# Patient Record
Sex: MALE | Race: BLACK OR AFRICAN AMERICAN | NOT HISPANIC OR LATINO | Employment: FULL TIME | URBAN - METROPOLITAN AREA
[De-identification: names, ages, dates, MRNs, and addresses within clinical notes are randomized per-mention and may not be internally consistent; named-entity substitution may affect disease eponyms.]

---

## 2022-02-03 ENCOUNTER — OFFICE VISIT (OUTPATIENT)
Dept: FAMILY MEDICINE | Facility: CLINIC | Age: 57
End: 2022-02-03
Payer: COMMERCIAL

## 2022-02-03 ENCOUNTER — LAB VISIT (OUTPATIENT)
Dept: LAB | Facility: HOSPITAL | Age: 57
End: 2022-02-03
Attending: FAMILY MEDICINE
Payer: COMMERCIAL

## 2022-02-03 VITALS
RESPIRATION RATE: 17 BRPM | HEART RATE: 79 BPM | DIASTOLIC BLOOD PRESSURE: 84 MMHG | SYSTOLIC BLOOD PRESSURE: 145 MMHG | OXYGEN SATURATION: 97 %

## 2022-02-03 DIAGNOSIS — Z00.00 VISIT FOR WELL MAN HEALTH CHECK: ICD-10-CM

## 2022-02-03 DIAGNOSIS — M54.16 LUMBAR RADICULOPATHY, CHRONIC: ICD-10-CM

## 2022-02-03 DIAGNOSIS — M54.31 BILATERAL SCIATICA: ICD-10-CM

## 2022-02-03 DIAGNOSIS — Z00.00 VISIT FOR WELL MAN HEALTH CHECK: Primary | ICD-10-CM

## 2022-02-03 DIAGNOSIS — M54.32 BILATERAL SCIATICA: ICD-10-CM

## 2022-02-03 DIAGNOSIS — M54.9 DORSALGIA, UNSPECIFIED: ICD-10-CM

## 2022-02-03 DIAGNOSIS — Z12.11 COLON CANCER SCREENING: ICD-10-CM

## 2022-02-03 LAB
ALBUMIN SERPL BCP-MCNC: 3.9 G/DL (ref 3.5–5.2)
ALP SERPL-CCNC: 50 U/L (ref 55–135)
ALT SERPL W/O P-5'-P-CCNC: 17 U/L (ref 10–44)
ANION GAP SERPL CALC-SCNC: 9 MMOL/L (ref 8–16)
AST SERPL-CCNC: 20 U/L (ref 10–40)
BASOPHILS # BLD AUTO: 0.05 K/UL (ref 0–0.2)
BASOPHILS NFR BLD: 0.6 % (ref 0–1.9)
BILIRUB SERPL-MCNC: 0.5 MG/DL (ref 0.1–1)
BUN SERPL-MCNC: 11 MG/DL (ref 6–20)
CALCIUM SERPL-MCNC: 9.2 MG/DL (ref 8.7–10.5)
CHLORIDE SERPL-SCNC: 103 MMOL/L (ref 95–110)
CHOLEST SERPL-MCNC: 200 MG/DL (ref 120–199)
CHOLEST/HDLC SERPL: 3.6 {RATIO} (ref 2–5)
CO2 SERPL-SCNC: 27 MMOL/L (ref 23–29)
COMPLEXED PSA SERPL-MCNC: 1.6 NG/ML (ref 0–4)
CREAT SERPL-MCNC: 0.9 MG/DL (ref 0.5–1.4)
DIFFERENTIAL METHOD: ABNORMAL
EOSINOPHIL # BLD AUTO: 0.2 K/UL (ref 0–0.5)
EOSINOPHIL NFR BLD: 2.5 % (ref 0–8)
ERYTHROCYTE [DISTWIDTH] IN BLOOD BY AUTOMATED COUNT: 13.7 % (ref 11.5–14.5)
EST. GFR  (AFRICAN AMERICAN): >60 ML/MIN/1.73 M^2
EST. GFR  (NON AFRICAN AMERICAN): >60 ML/MIN/1.73 M^2
GLUCOSE SERPL-MCNC: 95 MG/DL (ref 70–110)
HCT VFR BLD AUTO: 42.6 % (ref 40–54)
HDLC SERPL-MCNC: 56 MG/DL (ref 40–75)
HDLC SERPL: 28 % (ref 20–50)
HGB BLD-MCNC: 13.8 G/DL (ref 14–18)
IMM GRANULOCYTES # BLD AUTO: 0.03 K/UL (ref 0–0.04)
IMM GRANULOCYTES NFR BLD AUTO: 0.3 % (ref 0–0.5)
LDLC SERPL CALC-MCNC: 129.8 MG/DL (ref 63–159)
LYMPHOCYTES # BLD AUTO: 3.2 K/UL (ref 1–4.8)
LYMPHOCYTES NFR BLD: 35.3 % (ref 18–48)
MCH RBC QN AUTO: 28.8 PG (ref 27–31)
MCHC RBC AUTO-ENTMCNC: 32.4 G/DL (ref 32–36)
MCV RBC AUTO: 89 FL (ref 82–98)
MONOCYTES # BLD AUTO: 0.7 K/UL (ref 0.3–1)
MONOCYTES NFR BLD: 7.7 % (ref 4–15)
NEUTROPHILS # BLD AUTO: 4.8 K/UL (ref 1.8–7.7)
NEUTROPHILS NFR BLD: 53.6 % (ref 38–73)
NONHDLC SERPL-MCNC: 144 MG/DL
NRBC BLD-RTO: 0 /100 WBC
PLATELET # BLD AUTO: 335 K/UL (ref 150–450)
PMV BLD AUTO: 9.1 FL (ref 9.2–12.9)
POTASSIUM SERPL-SCNC: 4.4 MMOL/L (ref 3.5–5.1)
PROT SERPL-MCNC: 7.2 G/DL (ref 6–8.4)
RBC # BLD AUTO: 4.79 M/UL (ref 4.6–6.2)
SODIUM SERPL-SCNC: 139 MMOL/L (ref 136–145)
TRIGL SERPL-MCNC: 71 MG/DL (ref 30–150)
WBC # BLD AUTO: 8.96 K/UL (ref 3.9–12.7)

## 2022-02-03 PROCEDURE — 3079F DIAST BP 80-89 MM HG: CPT | Mod: CPTII,S$GLB,, | Performed by: FAMILY MEDICINE

## 2022-02-03 PROCEDURE — 3079F PR MOST RECENT DIASTOLIC BLOOD PRESSURE 80-89 MM HG: ICD-10-PCS | Mod: CPTII,S$GLB,, | Performed by: FAMILY MEDICINE

## 2022-02-03 PROCEDURE — 1160F PR REVIEW ALL MEDS BY PRESCRIBER/CLIN PHARMACIST DOCUMENTED: ICD-10-PCS | Mod: CPTII,S$GLB,, | Performed by: FAMILY MEDICINE

## 2022-02-03 PROCEDURE — 3077F SYST BP >= 140 MM HG: CPT | Mod: CPTII,S$GLB,, | Performed by: FAMILY MEDICINE

## 2022-02-03 PROCEDURE — 99999 PR PBB SHADOW E&M-EST. PATIENT-LVL IV: ICD-10-PCS | Mod: PBBFAC,,, | Performed by: FAMILY MEDICINE

## 2022-02-03 PROCEDURE — 85025 COMPLETE CBC W/AUTO DIFF WBC: CPT | Performed by: FAMILY MEDICINE

## 2022-02-03 PROCEDURE — 99386 PREV VISIT NEW AGE 40-64: CPT | Mod: 25,S$GLB,, | Performed by: FAMILY MEDICINE

## 2022-02-03 PROCEDURE — 36415 COLL VENOUS BLD VENIPUNCTURE: CPT | Mod: PN | Performed by: FAMILY MEDICINE

## 2022-02-03 PROCEDURE — 80053 COMPREHEN METABOLIC PANEL: CPT | Performed by: FAMILY MEDICINE

## 2022-02-03 PROCEDURE — 3077F PR MOST RECENT SYSTOLIC BLOOD PRESSURE >= 140 MM HG: ICD-10-PCS | Mod: CPTII,S$GLB,, | Performed by: FAMILY MEDICINE

## 2022-02-03 PROCEDURE — 99202 PR OFFICE/OUTPT VISIT, NEW, LEVL II, 15-29 MIN: ICD-10-PCS | Mod: 25,S$GLB,, | Performed by: FAMILY MEDICINE

## 2022-02-03 PROCEDURE — 80061 LIPID PANEL: CPT | Performed by: FAMILY MEDICINE

## 2022-02-03 PROCEDURE — 1159F MED LIST DOCD IN RCRD: CPT | Mod: CPTII,S$GLB,, | Performed by: FAMILY MEDICINE

## 2022-02-03 PROCEDURE — 1159F PR MEDICATION LIST DOCUMENTED IN MEDICAL RECORD: ICD-10-PCS | Mod: CPTII,S$GLB,, | Performed by: FAMILY MEDICINE

## 2022-02-03 PROCEDURE — 84153 ASSAY OF PSA TOTAL: CPT | Performed by: FAMILY MEDICINE

## 2022-02-03 PROCEDURE — 99999 PR PBB SHADOW E&M-EST. PATIENT-LVL IV: CPT | Mod: PBBFAC,,, | Performed by: FAMILY MEDICINE

## 2022-02-03 PROCEDURE — 99202 OFFICE O/P NEW SF 15 MIN: CPT | Mod: 25,S$GLB,, | Performed by: FAMILY MEDICINE

## 2022-02-03 PROCEDURE — 99386 PR PREVENTIVE VISIT,NEW,40-64: ICD-10-PCS | Mod: 25,S$GLB,, | Performed by: FAMILY MEDICINE

## 2022-02-03 PROCEDURE — 1160F RVW MEDS BY RX/DR IN RCRD: CPT | Mod: CPTII,S$GLB,, | Performed by: FAMILY MEDICINE

## 2022-02-03 PROCEDURE — 86803 HEPATITIS C AB TEST: CPT | Performed by: FAMILY MEDICINE

## 2022-02-03 RX ORDER — TIZANIDINE 4 MG/1
4 TABLET ORAL EVERY 6 HOURS PRN
Qty: 30 TABLET | Refills: 1 | Status: SHIPPED | OUTPATIENT
Start: 2022-02-03 | End: 2022-02-13

## 2022-02-03 RX ORDER — SULINDAC 200 MG/1
200 TABLET ORAL 2 TIMES DAILY
Qty: 60 TABLET | Refills: 1 | Status: SHIPPED | OUTPATIENT
Start: 2022-02-03 | End: 2022-03-18 | Stop reason: CLARIF

## 2022-02-03 NOTE — PROGRESS NOTES
"Well Man VISIT      CHIEF COMPLAINT  Chief Complaint   Patient presents with    [facility]       HPI  Chanjesusmichael Dave Greenwood is a 56 y.o. male who presents for physical.     Social Factors  Tobacco use: Yes 6-7 cigarettes a sday  Ready to Quit: No  Alcohol: Yes occasionally  Intimate partner violence screening  "Do you feel safe in your current relationship?" Yes   "Have you ever been in a relationship in which your partner frightened you or hurt you?" No  Living Will/POA: No  Regular Exercise: No    Depression  Over the past two weeks, have you felt down, depressed, or hopeless? No  Over the past two weeks, have you felt little interest or pleasure in doing things? No    Reproductive Health  STD screening in last year: deferred  HIV screening: deferred    Screen for Chronic Disease  CHD Risk Factors: advanced age (older than 55 for men, 65 for women), male gender and smoking/ tobacco exposure  There is no height or weight on file to calculate BMI.  Dyslipidemia screening needed: order today  T2DM screening needed: order today  Colonoscopy needed: order today  PSA needed: order today  AAA screening needed:n/a  Screen men 35 years and older, and men 20 to 34 years of age who have cardiovascular risk factors for dyslipidemia  Begin screening colonoscopies at 50 years of age in men of average risk, and continue until 75 years of age; offer fecal occult blood testing every year, flexible sigmoidoscopy every five years combined with fecal occult blood testing every three years, or colonoscopy every 10 years   The American Urological Association recommends offering PSA testing and digital rectal examination to well-informed men beginning at 40 years of age and continuing until life expectancy is less than 10 years  Screen once with ultrasonography in men 65 to 75 years of age if they have a family history or have smoked at [id number] cigarettes in their lifetime  Screen men with a sustained blood pressure greater " than 135/80 mm Hg for T2DM      Immunizations  delayed    ALLERGIES and MEDS were verified.   PMHx, PSHx, FHx, SOCIALHx were updated as pertinent.    REVIEW OF SYSTEMS  Review of Systems   Constitutional: Negative.    HENT: Negative.    Eyes: Negative.    Respiratory: Negative.    Cardiovascular: Negative.    Gastrointestinal: Negative.    Genitourinary: Negative.    Musculoskeletal: Positive for myalgias.   Skin: Negative.    Neurological: Positive for tingling.         PHYSICAL EXAM  VITAL SIGNS: BP (!) 145/84 (BP Location: Left arm, Patient Position: Sitting, BP Method: Medium (Automatic))   Pulse 79   Resp 17   SpO2 97%   GEN: Well developed, Well nourished, No acute distress.  HENT: Normocephalic, Atraumatic, Bilateral external ears normal, Nose normal, Oropharynx moist, No oral exudates.   Eyes: PERRLA, EOMI, Conjunctiva normal, No discharge.   Neck: Supple, No tenderness.  Lymphatic: No cervical or supraclavicular lymphadenopathy noted.   Cardiovascular: Normal heart rate, Normal rhythm, No murmurs, No rubs, No gallops.   Thorax & Lungs: Normal breath sounds, No respiratory distress, No wheezing.  Abdomen: Soft, No tenderness, Bowel sounds normal.  Genital: deferred deferred  Skin: Warm, Dry, No erythema, No rash.   Extremities: No edema, No tenderness.       ASSESSMENT/PLAN    Vicente was seen today for establish care.    Diagnoses and all orders for this visit:    Visit for [facility] check  -     CBC Auto Differential; Future  -     Comprehensive Metabolic Panel; Future  -     Lipid Panel; Future  -     PSA, Screening; Future  -     Urinalysis; Future  -     Hepatitis C Antibody; Future    Colon cancer screening  -     Case Request Endoscopy: COLONOSCOPY    Bilateral sciatica  -     tiZANidine (ZANAFLEX) 4 MG tablet; Take 1 tablet (4 mg total) by mouth every 6 (six) hours as needed (muscle spasms in back).  -     sulindac (CLINORIL) 200 MG Tab; Take 1 tablet (200 mg total) by mouth 2 (two) times  daily.    Dorsalgia, unspecified  -     MRI Lumbar Spine Without Contrast; Future    Lumbar radiculopathy, chronic  -     MRI Lumbar Spine Without Contrast; Future         FOLLOW UP: pending labs and imaging

## 2022-02-03 NOTE — PROGRESS NOTES
Patient presenting today for chronic back pain x 8 years that has progressively been worsening.  He states the pain worsens when he leans forwards and radiates down both lower extremities.  Had MRI at [facility] in 2014 that he states showed bulging disks, but doesn't recall anything else abnormal.  No history of spine surgery and no personal/family history of neurological disorders.  His grandfather did die of prostate cancer at age 80.  Patient states pain is affecting daily living and limiting him as to what he can do.   No loss of bowel or bladder control, but does get intermittent tingling in bilateral inner thighs.      Physical exam:  No pain on palpattion of spinous processes  Mild pain on palpation of paraspinal muscles  Pain with bending forward      A/p  Refer to pain management  MRI lumbar spine ordered as symptoms are chronic and worsening  NSAID and muscle relaxer for pain  Patient to go to the ED for any sudden weakness or loss of bowel/bladder control        Cesar Herrera MD

## 2022-02-04 LAB — HCV AB SERPL QL IA: NEGATIVE

## 2022-02-08 ENCOUNTER — TELEPHONE (OUTPATIENT)
Dept: FAMILY MEDICINE | Facility: CLINIC | Age: 57
End: 2022-02-08
Payer: COMMERCIAL

## 2022-02-08 DIAGNOSIS — E78.5 DYSLIPIDEMIA: Primary | ICD-10-CM

## 2022-02-08 RX ORDER — ATORVASTATIN CALCIUM 40 MG/1
40 TABLET, FILM COATED ORAL DAILY
Qty: 90 TABLET | Refills: 1 | Status: SHIPPED | OUTPATIENT
Start: 2022-02-08 | End: 2022-02-16

## 2022-02-08 NOTE — PROGRESS NOTES
Please let patient know that labs are back and most look good.  His cholesterol is at a level that needs medication, so I am going to send that to his pharmacy.  He will need to take it nightly and follow up 3 months.      Thanks  Dr. Herrera       The 10-year ASCVD risk score (Oscar STRANGE Jr., et al., 2013) is: 14%    Values used to calculate the score:      Age: 56 years      Sex: Male      Is Non- : Yes      Diabetic: No      Tobacco smoker: Yes      Systolic Blood Pressure: 145 mmHg      Is BP treated: No      HDL Cholesterol: 56 mg/dL      Total Cholesterol: 200 mg/dL

## 2022-02-09 ENCOUNTER — TELEPHONE (OUTPATIENT)
Dept: FAMILY MEDICINE | Facility: CLINIC | Age: 57
End: 2022-02-09
Payer: COMMERCIAL

## 2022-02-10 ENCOUNTER — HOSPITAL ENCOUNTER (OUTPATIENT)
Dept: RADIOLOGY | Facility: HOSPITAL | Age: 57
Discharge: HOME | End: 2022-02-10
Attending: FAMILY MEDICINE
Payer: COMMERCIAL

## 2022-02-10 DIAGNOSIS — M54.16 LUMBAR RADICULOPATHY, CHRONIC: ICD-10-CM

## 2022-02-10 DIAGNOSIS — M54.9 DORSALGIA, UNSPECIFIED: ICD-10-CM

## 2022-02-10 PROCEDURE — 72148 MRI LUMBAR SPINE W/O DYE: CPT | Mod: TC

## 2022-02-10 PROCEDURE — 72148 MRI LUMBAR SPINE W/O DYE: CPT | Mod: 26,,, | Performed by: RADIOLOGY

## 2022-02-10 PROCEDURE — 72148 MRI LUMBAR SPINE WITHOUT CONTRAST: ICD-10-PCS | Mod: 26,,, | Performed by: RADIOLOGY

## 2022-02-11 ENCOUNTER — TELEPHONE (OUTPATIENT)
Dept: FAMILY MEDICINE | Facility: CLINIC | Age: 57
End: 2022-02-11
Payer: COMMERCIAL

## 2022-02-11 NOTE — TELEPHONE ENCOUNTER
----- Message from Cesar Herrera MD sent at 2/11/2022  8:12 AM CST -----  Please let patient know that his MRI showed some significant narrowing of the spinal canal and this is likely the cause of his pain.  I would recommend he see either pain management or neurosurgery to see what the next step would be.      Thanks  Dr. Herrera

## 2022-02-11 NOTE — PROGRESS NOTES
Please let patient know that his MRI showed some significant narrowing of the spinal canal and this is likely the cause of his pain.  I would recommend he see either pain management or neurosurgery to see what the next step would be.      Thanks  Dr. Herrera

## 2022-02-16 ENCOUNTER — OFFICE VISIT (OUTPATIENT)
Dept: PAIN MEDICINE | Facility: CLINIC | Age: 57
End: 2022-02-16
Payer: COMMERCIAL

## 2022-02-16 VITALS
SYSTOLIC BLOOD PRESSURE: 159 MMHG | OXYGEN SATURATION: 97 % | BODY MASS INDEX: 33.51 KG/M2 | DIASTOLIC BLOOD PRESSURE: 84 MMHG | HEART RATE: 72 BPM | HEIGHT: 63 IN | WEIGHT: 189.13 LBS

## 2022-02-16 DIAGNOSIS — M54.16 LUMBAR RADICULOPATHY: ICD-10-CM

## 2022-02-16 DIAGNOSIS — M48.062 SPINAL STENOSIS OF LUMBAR REGION WITH NEUROGENIC CLAUDICATION: ICD-10-CM

## 2022-02-16 DIAGNOSIS — M54.9 DORSALGIA, UNSPECIFIED: ICD-10-CM

## 2022-02-16 DIAGNOSIS — M71.38 SYNOVIAL CYST OF LUMBAR FACET JOINT: ICD-10-CM

## 2022-02-16 DIAGNOSIS — M47.816 LUMBAR SPONDYLOSIS: ICD-10-CM

## 2022-02-16 DIAGNOSIS — M51.36 DDD (DEGENERATIVE DISC DISEASE), LUMBAR: Primary | ICD-10-CM

## 2022-02-16 DIAGNOSIS — D17.79 EPIDURAL LIPOMATOSIS: ICD-10-CM

## 2022-02-16 DIAGNOSIS — M54.16 LUMBAR RADICULOPATHY, CHRONIC: ICD-10-CM

## 2022-02-16 PROBLEM — M51.369 DDD (DEGENERATIVE DISC DISEASE), LUMBAR: Status: ACTIVE | Noted: 2022-02-16

## 2022-02-16 PROCEDURE — 3079F PR MOST RECENT DIASTOLIC BLOOD PRESSURE 80-89 MM HG: ICD-10-PCS | Mod: CPTII,S$GLB,, | Performed by: PAIN MEDICINE

## 2022-02-16 PROCEDURE — 1159F PR MEDICATION LIST DOCUMENTED IN MEDICAL RECORD: ICD-10-PCS | Mod: CPTII,S$GLB,, | Performed by: PAIN MEDICINE

## 2022-02-16 PROCEDURE — 1159F MED LIST DOCD IN RCRD: CPT | Mod: CPTII,S$GLB,, | Performed by: PAIN MEDICINE

## 2022-02-16 PROCEDURE — 99204 PR OFFICE/OUTPT VISIT, NEW, LEVL IV, 45-59 MIN: ICD-10-PCS | Mod: S$GLB,,, | Performed by: PAIN MEDICINE

## 2022-02-16 PROCEDURE — 1160F RVW MEDS BY RX/DR IN RCRD: CPT | Mod: CPTII,S$GLB,, | Performed by: PAIN MEDICINE

## 2022-02-16 PROCEDURE — 3079F DIAST BP 80-89 MM HG: CPT | Mod: CPTII,S$GLB,, | Performed by: PAIN MEDICINE

## 2022-02-16 PROCEDURE — 99999 PR PBB SHADOW E&M-EST. PATIENT-LVL V: ICD-10-PCS | Mod: PBBFAC,,, | Performed by: PAIN MEDICINE

## 2022-02-16 PROCEDURE — 99999 PR PBB SHADOW E&M-EST. PATIENT-LVL V: CPT | Mod: PBBFAC,,, | Performed by: PAIN MEDICINE

## 2022-02-16 PROCEDURE — 3077F SYST BP >= 140 MM HG: CPT | Mod: CPTII,S$GLB,, | Performed by: PAIN MEDICINE

## 2022-02-16 PROCEDURE — 3008F BODY MASS INDEX DOCD: CPT | Mod: CPTII,S$GLB,, | Performed by: PAIN MEDICINE

## 2022-02-16 PROCEDURE — 3008F PR BODY MASS INDEX (BMI) DOCUMENTED: ICD-10-PCS | Mod: CPTII,S$GLB,, | Performed by: PAIN MEDICINE

## 2022-02-16 PROCEDURE — 99204 OFFICE O/P NEW MOD 45 MIN: CPT | Mod: S$GLB,,, | Performed by: PAIN MEDICINE

## 2022-02-16 PROCEDURE — 1160F PR REVIEW ALL MEDS BY PRESCRIBER/CLIN PHARMACIST DOCUMENTED: ICD-10-PCS | Mod: CPTII,S$GLB,, | Performed by: PAIN MEDICINE

## 2022-02-16 PROCEDURE — 3077F PR MOST RECENT SYSTOLIC BLOOD PRESSURE >= 140 MM HG: ICD-10-PCS | Mod: CPTII,S$GLB,, | Performed by: PAIN MEDICINE
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
## 2022-02-18 ENCOUNTER — OFFICE VISIT (OUTPATIENT)
Dept: NEUROSURGERY | Facility: CLINIC | Age: 57
End: 2022-02-18
Payer: COMMERCIAL

## 2022-02-18 ENCOUNTER — HOSPITAL ENCOUNTER (EMERGENCY)
Facility: HOSPITAL | Age: 57
Discharge: HOME | End: 2022-02-18
Attending: EMERGENCY MEDICINE
Payer: COMMERCIAL

## 2022-02-18 VITALS
WEIGHT: 188.25 LBS | HEIGHT: 63 IN | DIASTOLIC BLOOD PRESSURE: 90 MMHG | BODY MASS INDEX: 33.36 KG/M2 | HEART RATE: 71 BPM | SYSTOLIC BLOOD PRESSURE: 140 MMHG | OXYGEN SATURATION: 95 %

## 2022-02-18 VITALS
OXYGEN SATURATION: 98 % | TEMPERATURE: 97 F | BODY MASS INDEX: 32.27 KG/M2 | WEIGHT: 189 LBS | SYSTOLIC BLOOD PRESSURE: 157 MMHG | HEIGHT: 64 IN | DIASTOLIC BLOOD PRESSURE: 63 MMHG | RESPIRATION RATE: 18 BRPM | HEART RATE: 64 BPM

## 2022-02-18 DIAGNOSIS — G89.29 ACUTE EXACERBATION OF CHRONIC LOW BACK PAIN: Primary | ICD-10-CM

## 2022-02-18 DIAGNOSIS — M48.062 SPINAL STENOSIS OF LUMBAR REGION WITH NEUROGENIC CLAUDICATION: ICD-10-CM

## 2022-02-18 DIAGNOSIS — M54.50 ACUTE EXACERBATION OF CHRONIC LOW BACK PAIN: Primary | ICD-10-CM

## 2022-02-18 PROCEDURE — 1160F PR REVIEW ALL MEDS BY PRESCRIBER/CLIN PHARMACIST DOCUMENTED: ICD-10-PCS | Mod: CPTII,S$GLB,, | Performed by: PHYSICIAN ASSISTANT

## 2022-02-18 PROCEDURE — 3008F BODY MASS INDEX DOCD: CPT | Mod: CPTII,S$GLB,, | Performed by: PHYSICIAN ASSISTANT

## 2022-02-18 PROCEDURE — 3080F DIAST BP >= 90 MM HG: CPT | Mod: CPTII,S$GLB,, | Performed by: PHYSICIAN ASSISTANT

## 2022-02-18 PROCEDURE — 3077F SYST BP >= 140 MM HG: CPT | Mod: CPTII,S$GLB,, | Performed by: PHYSICIAN ASSISTANT

## 2022-02-18 PROCEDURE — 1159F PR MEDICATION LIST DOCUMENTED IN MEDICAL RECORD: ICD-10-PCS | Mod: CPTII,S$GLB,, | Performed by: PHYSICIAN ASSISTANT

## 2022-02-18 PROCEDURE — 99999 PR PBB SHADOW E&M-EST. PATIENT-LVL IV: CPT | Mod: PBBFAC,,, | Performed by: PHYSICIAN ASSISTANT

## 2022-02-18 PROCEDURE — 63600175 PHARM REV CODE 636 W HCPCS: Performed by: PHYSICIAN ASSISTANT

## 2022-02-18 PROCEDURE — 99205 PR OFFICE/OUTPT VISIT, NEW, LEVL V, 60-74 MIN: ICD-10-PCS | Mod: S$GLB,,, | Performed by: PHYSICIAN ASSISTANT

## 2022-02-18 PROCEDURE — 99284 EMERGENCY DEPT VISIT MOD MDM: CPT | Mod: 25

## 2022-02-18 PROCEDURE — 1160F RVW MEDS BY RX/DR IN RCRD: CPT | Mod: CPTII,S$GLB,, | Performed by: PHYSICIAN ASSISTANT

## 2022-02-18 PROCEDURE — 99205 OFFICE O/P NEW HI 60 MIN: CPT | Mod: S$GLB,,, | Performed by: PHYSICIAN ASSISTANT

## 2022-02-18 PROCEDURE — 1159F MED LIST DOCD IN RCRD: CPT | Mod: CPTII,S$GLB,, | Performed by: PHYSICIAN ASSISTANT

## 2022-02-18 PROCEDURE — 96372 THER/PROPH/DIAG INJ SC/IM: CPT

## 2022-02-18 PROCEDURE — 3008F PR BODY MASS INDEX (BMI) DOCUMENTED: ICD-10-PCS | Mod: CPTII,S$GLB,, | Performed by: PHYSICIAN ASSISTANT

## 2022-02-18 PROCEDURE — 3080F PR MOST RECENT DIASTOLIC BLOOD PRESSURE >= 90 MM HG: ICD-10-PCS | Mod: CPTII,S$GLB,, | Performed by: PHYSICIAN ASSISTANT

## 2022-02-18 PROCEDURE — 99999 PR PBB SHADOW E&M-EST. PATIENT-LVL IV: ICD-10-PCS | Mod: PBBFAC,,, | Performed by: PHYSICIAN ASSISTANT

## 2022-02-18 PROCEDURE — 3077F PR MOST RECENT SYSTOLIC BLOOD PRESSURE >= 140 MM HG: ICD-10-PCS | Mod: CPTII,S$GLB,, | Performed by: PHYSICIAN ASSISTANT

## 2022-02-18 RX ORDER — OXYCODONE AND ACETAMINOPHEN 5; 325 MG/1; MG/1
1 TABLET ORAL EVERY 4 HOURS PRN
Qty: 10 TABLET | Refills: 0 | Status: SHIPPED | OUTPATIENT
Start: 2022-02-18 | End: 2022-03-18 | Stop reason: CLARIF

## 2022-02-18 RX ORDER — METHOCARBAMOL 500 MG/1
500 TABLET, FILM COATED ORAL 3 TIMES DAILY
Qty: 30 TABLET | Refills: 0 | Status: SHIPPED | OUTPATIENT
Start: 2022-02-18 | End: 2022-03-18 | Stop reason: CLARIF

## 2022-02-18 RX ORDER — DEXAMETHASONE SODIUM PHOSPHATE 4 MG/ML
12 INJECTION, SOLUTION INTRA-ARTICULAR; INTRALESIONAL; INTRAMUSCULAR; INTRAVENOUS; SOFT TISSUE
Status: COMPLETED | OUTPATIENT
Start: 2022-02-18 | End: 2022-02-18

## 2022-02-18 RX ORDER — HYDROMORPHONE HYDROCHLORIDE 2 MG/ML
1 INJECTION, SOLUTION INTRAMUSCULAR; INTRAVENOUS; SUBCUTANEOUS
Status: COMPLETED | OUTPATIENT
Start: 2022-02-18 | End: 2022-02-18

## 2022-02-18 RX ADMIN — DEXAMETHASONE SODIUM PHOSPHATE 12 MG: 4 INJECTION INTRA-ARTICULAR; INTRALESIONAL; INTRAMUSCULAR; INTRAVENOUS; SOFT TISSUE at 09:02

## 2022-02-18 RX ADMIN — HYDROMORPHONE HYDROCHLORIDE 1 MG: 2 INJECTION, SOLUTION INTRAMUSCULAR; INTRAVENOUS; SUBCUTANEOUS at 09:02

## 2022-02-18 NOTE — PROGRESS NOTES
[facility]  Neurosurgery    SUBJECTIVE:     History of Present Illness:  Vicente Greenwood is a 56 y.o. male with below listed PMH who presents with back pain and neurogenic claudication as a referral from pain management. Pain has been present for many years but became severe over the past few months. It worsens with prolonged standing and walking and improves with sitting. 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 Denies numbness or weakness, but his legs become heavy after prolonged walking. Denies SA and b/b dysfunction.     He has failed PT. Has not tried DEBORAH. Currently taking clinoril with minimal relief. He is interested in surgery but would prefer to try conservative treatments first.     Denies neck or arm pain, numbness, and weakness. Denies dropping items from his hands.    (Not in a hospital admission)      Review of patient's allergies indicates:  No Known Allergies    History reviewed. No pertinent past medical history.  History reviewed. No pertinent surgical history.  History reviewed. No pertinent family history.  Social History     Tobacco Use    Smoking status: Current Every Day Smoker     Packs/day: 0.30     Types: Cigarettes    Smokeless tobacco: Never Used   Substance Use Topics    Alcohol use: Yes        Review of Systems:  As noted in HPI    OBJECTIVE:     Vital Signs (Most Recent):  Pulse: 71 (02/18/22 0851)  BP: (!) 140/90 (02/18/22 0851)  SpO2: 95 % (02/18/22 0851)    Physical Exam:  General: well developed, well nourished, no distress  Head: normocephalic, atraumatic  Neurologic: Alert and oriented. Thought content appropriate  GCS: Motor: 6/Verbal: 5/Eyes: 4 GCS Total: 15  Language: No aphasia  Speech: No dysarthria  Cranial nerves: face symmetric, tongue midline, CN II-XII grossly intact.   Eyes: pupils equal, round, reactive to light with accommodation, EOMI.   Pulmonary: normal respirations, not labored, no accessory  muscles used  Sensory: intact to light touch throughout  Motor Strength: Moves all extremities spontaneously with good tone.  Full strength upper and lower extremities. No abnormal movements seen.     Strength  Deltoids Triceps Biceps Wrist Extension Wrist Flexion Hand    Upper: R 5/5 5/5 5/5 5/5 5/5 5/5    L 5/5 5/5 5/5 5/5 5/5 5/5     Iliopsoas Quadriceps Knee  Flexion Tibialis  anterior Gastro- cnemius EHL   Lower: R 5/5 5/5 5/5 5/5 5/5 5/5    L 5/5 5/5 5/5 5/5 5/5 5/5     DTR's - 2 + and symmetric patellar  Medel: absent  Clonus: absent    Skin: warm, dry and intact, no rashes  Gait: antalgic      Midline Bony Tenderness: negative  Paraspinous muscle tenderness: negative    Diagnostic Results:  I have personally reviewed imaging and agree with the findings.     Lumbar MRI 2/10/22     1. Multilevel degenerative changes of the lumbar spine detailed above.  Moderate to severe spinal canal stenosis noted at L3-L5.  Moderate to severe neural foraminal narrowing noted at L3-S1. Left synovial cyst at L4-5    ASSESSMENT/PLAN:     Vicente Greenwood is a 56 y.o. male who presents with lumbar stenosis with neurogenic claudication along with a synovial cyst at L4-5. MRI reveals diffuse degenerative changes including severe facet arthropathy. He would like to try conservative treatments with pain mgmt before considering surgery. I will order upright lumbar xrays with flex/ext views to rule out instability.    Case discussed with Dr. Barboza who suggested the patient try a cyst rupture with IR in conjunction with DEBORAH/PT. Attempted to call patient to discuss but was unable to reach him. LVM and will call patient back next week.       Please feel free to call with any further questions      Ashly Parsons PA-C  [facility]  Department of Neurosurgery  [phone]    Disclaimer: This note was dictated by speech recognition. Minor errors in transcription may be present.  Please call with any questions.

## 2022-02-19 NOTE — ED TRIAGE NOTES
Patient identifiers for Vicente Greenwood 56 y.o. male checked and correct.  Chief Complaint   Patient presents with    Spinal Stenosis     Pt diagnosed with spinal stenosis and neurogenic claudication and referred to neurology. Pt reports saw neurology this morning but now pain has increasingly worsened      No past medical history on file.  Allergies reported: Review of patient's allergies indicates:  No Known Allergies      LOC: Patient is awake, alert, and aware of environment with an appropriate affect. Patient is oriented x 3 and speaking appropriately.  APPEARANCE: Patient resting comfortably and in no acute distress. Patient is clean and well groomed, patient's clothing is properly fastened.  HEENT: WNL  SKIN: The skin is warm and dry. Patient has normal skin turgor and moist mucus membranes. Skin is intact; no bruising or breakdown noted.  MUSKULOSKELETAL: Patient is moving all extremities well, no obvious deformities noted. Pulses intact. +intermittent back pain   RESPIRATORY: Airway is open and patent. Respirations are spontaneous and non-labored with normal effort and rate, BBS=clear  CARDIAC: Patient has a normal rate and rhythm. No peripheral edema noted.   ABDOMEN: No distention noted. Bowel sounds active in all 4 quadrants. Soft and non-tender upon palpation.  NEUROLOGICAL: PERRL. Facial expression is symmetrical. Hand grasps are equal bilaterally. Normal sensation in all extremities when touched with finger.

## 2022-02-19 NOTE — ED PROVIDER NOTES
Encounter Date: 2/18/2022       History     Chief Complaint   Patient presents with    Spinal Stenosis     Pt diagnosed with spinal stenosis and neurogenic claudication and referred to neurology. Pt reports saw neurology this morning but now pain has increasingly worsened      Chief Complaint:  Back pain  History of  Present Illness: History obtained from patient. This 56 y.o. male who has no known past medical history presents to the ED complaining of lower back pain since 2014 with worsening over the last month.  Patient states he has been followed by neurosurgery who recently performed MRI.  Patient states he had an appointment with his neurosurgeon today who stated his back pain was likely secondary to neurogenic claudication and spinal stenosis.  Patient states that there are plans to attend PT.  Patient states that he normally takes anti-inflammatories and has relief of pain.  However, patient states that the medications have not been working over last month.  Denies urinary incontinence, bowel incontinence, saddle anesthesia, numbness, tingling, weakness, dysuria, hematuria urinary frequency, abdominal pain, nausea vomiting, fever, history of IV drug use.          Review of patient's allergies indicates:  No Known Allergies  No past medical history on file.  No past surgical history on file.  No family history on file.  Social History     Tobacco Use    Smoking status: Current Every Day Smoker     Packs/day: 0.30     Types: Cigarettes    Smokeless tobacco: Never Used   Substance Use Topics    Alcohol use: Yes     Review of Systems   Constitutional: Negative for chills and fever.   HENT: Negative for congestion, rhinorrhea and sore throat.    Eyes: Negative for visual disturbance.   Respiratory: Negative for cough and shortness of breath.    Cardiovascular: Negative for chest pain.   Gastrointestinal: Negative for abdominal pain, diarrhea, nausea and vomiting.   Genitourinary: Negative for dysuria,  frequency and hematuria.   Musculoskeletal: Positive for back pain.   Skin: Negative for rash.   Neurological: Negative for dizziness, weakness and headaches.        (-) urinary incontinence  (-) bowel incontinence  (-) saddle anesthesia  (-) urinary retention     Hematological: Does not bruise/bleed easily.       Physical Exam     Initial Vitals [02/18/22 1902]   BP Pulse Resp Temp SpO2   (!) 181/88 78 18 97 °F (36.1 °C) 100 %      MAP       --         Physical Exam    Nursing note and vitals reviewed.  Constitutional: He appears well-developed and well-nourished. He is active.  Non-toxic appearance. He does not have a sickly appearance. He does not appear ill.   HENT:   Head: Normocephalic and atraumatic.   Nose: Nose normal.   Mouth/Throat: Uvula is midline, oropharynx is clear and moist and mucous membranes are normal.   Eyes: Conjunctivae, EOM and lids are normal. Pupils are equal, round, and reactive to light.   Neck: Neck supple.   Normal range of motion.   Full passive range of motion without pain.     Cardiovascular: Normal rate and regular rhythm.   Pulses:       Radial pulses are 2+ on the right side and 2+ on the left side.        Dorsalis pedis pulses are 2+ on the right side and 2+ on the left side.   Abdominal: Abdomen is soft. There is no abdominal tenderness.   Musculoskeletal:      Cervical back: Full passive range of motion without pain, normal range of motion and neck supple.      Comments: No C-spine, T-spine or L-spine midline tenderness.  There is diffuse tenderness to the lumbar back.  There is full range of motion of the bilateral upper and lower extremities.      Neurological: He is alert and oriented to person, place, and time.   Skin: Skin is warm. Capillary refill takes less than 2 seconds.         ED Course   Procedures  Labs Reviewed - No data to display       Imaging Results    None          Medications   dexamethasone injection 12 mg (has no administration in time range)    HYDROmorphone (PF) injection 1 mg (has no administration in time range)     Medical Decision Making:   ED Management:  This is an evaluation of a 56 y.o. male that presents to the Emergency Department for back pain.  Denies fever, rash, night sweats, weight loss, dysuria, bowel/bladder incontinence, or IV\SQ drug use. The patient is a non-toxic, afebrile, and well appearing male. On physical exam, there is tenderness to the lumbar back. There is no abdominal pain to palpation, CVA tenderness, or saddle anesthesia. Strength and sensation are symmetric bilaterally.  Upper and lower extremity pulses are symmetrical. There is no rash, erythema, open wounds, or increased warmth over the back.      Vital signs reassuring.     Given the above findings, my overall impression is acute on chronic back pain. Given the above findings, I do not think the patient has acute vertebral fracture, subluxation, dislocation, sciatica, epidural abscess, cauda equina, shingles, UTI.    The diagnosis, treatment plan, instructions for follow-up and reevaluation with primary care as well as ED return precautions were discussed and understanding was verbalized. All questions or concerns have been addressed.  Instructed patient to follow-up with his neurosurgeon.                        Clinical Impression:   Final diagnoses:  [M54.50, G89.29] Acute exacerbation of chronic low back pain (Primary)          ED Disposition Condition    Discharge Stable        ED Prescriptions     Medication Sig Dispense Start Date End Date Auth. Provider    oxyCODONE-acetaminophen (PERCOCET) 5-325 mg per tablet Take 1 tablet by mouth every 4 (four) hours as needed for Pain. 10 tablet 2/18/2022  Hardy Wilson PA-C    methocarbamoL (ROBAXIN) 500 MG Tab Take 1 tablet (500 mg total) by mouth 3 (three) times daily. 30 tablet 2/18/2022  Hardy Wilson PA-C        Follow-up Information     Follow up With Specialties Details Why Contact Donte Parsons  [facility]   [address]  [address]  [facility] [address]  [phone]      [facility] Emergency Dept Emergency Medicine Go in 1 day If symptoms worsen [address]  [facility] [id number] [phone]           Hardy Wilson PA-C  02/18/22 2042

## 2022-02-19 NOTE — FIRST PROVIDER EVALUATION
Emergency Department TeleTriage Encounter Note      CHIEF COMPLAINT    Chief Complaint   Patient presents with    Spinal Stenosis     Pt diagnosed with spinal stenosis and neurogenic claudication and referred to neurology. Pt reports saw neurology this morning but now pain has increasingly worsened        VITAL SIGNS   Initial Vitals [02/18/22 1902]   BP Pulse Resp Temp SpO2   (!) 181/88 78 18 97 °F (36.1 °C) 100 %      MAP       --            ALLERGIES    Review of patient's allergies indicates:  No Known Allergies    PROVIDER TRIAGE NOTE    56 year old male w/ hx of spinal stenosis presents for worsening lower back and leg pain. Here for pain control.     No orders placed at this time. Care will be transferred to an alternate provider when patient is roomed for a full evaluation. Any additional orders and the final disposition will be determined by that provider.        ORDERS  Labs Reviewed - No data to display    ED Orders (720h ago, onward)    None            Virtual Visit Note: The provider triage portion of this emergency department evaluation and documentation was performed via [employer], a HIPAA-compliant telemedicine application, in concert with a tele-presenter in the room. A face to face patient evaluation with one of my colleagues will occur once the patient is placed in an emergency department room.      DISCLAIMER: This note was prepared with Keywee voice recognition transcription software. Garbled syntax, mangled pronouns, and other bizarre constructions may be attributed to that software system.

## 2022-02-20 ENCOUNTER — HOSPITAL ENCOUNTER (EMERGENCY)
Facility: HOSPITAL | Age: 57
Discharge: HOME | End: 2022-02-20
Attending: EMERGENCY MEDICINE
Payer: COMMERCIAL

## 2022-02-20 VITALS
HEIGHT: 63 IN | TEMPERATURE: 98 F | OXYGEN SATURATION: 100 % | RESPIRATION RATE: 20 BRPM | BODY MASS INDEX: 33.49 KG/M2 | DIASTOLIC BLOOD PRESSURE: 85 MMHG | WEIGHT: 189 LBS | HEART RATE: 67 BPM | SYSTOLIC BLOOD PRESSURE: 140 MMHG

## 2022-02-20 DIAGNOSIS — M54.32 BILATERAL SCIATICA: Primary | ICD-10-CM

## 2022-02-20 DIAGNOSIS — M54.31 BILATERAL SCIATICA: Primary | ICD-10-CM

## 2022-02-20 PROCEDURE — 99284 EMERGENCY DEPT VISIT MOD MDM: CPT | Mod: 25

## 2022-02-20 PROCEDURE — 63600175 PHARM REV CODE 636 W HCPCS: Performed by: PHYSICIAN ASSISTANT

## 2022-02-20 PROCEDURE — 25000003 PHARM REV CODE 250: Performed by: PHYSICIAN ASSISTANT

## 2022-02-20 PROCEDURE — 96372 THER/PROPH/DIAG INJ SC/IM: CPT

## 2022-02-20 RX ORDER — KETOROLAC TROMETHAMINE 30 MG/ML
30 INJECTION, SOLUTION INTRAMUSCULAR; INTRAVENOUS
Status: COMPLETED | OUTPATIENT
Start: 2022-02-20 | End: 2022-02-20

## 2022-02-20 RX ORDER — IBUPROFEN 600 MG/1
600 TABLET ORAL EVERY 6 HOURS PRN
Qty: 20 TABLET | Refills: 0 | Status: SHIPPED | OUTPATIENT
Start: 2022-02-20 | End: 2022-02-25

## 2022-02-20 RX ORDER — LIDOCAINE 50 MG/G
1 PATCH TOPICAL DAILY
Qty: 15 PATCH | Refills: 0 | Status: SHIPPED | OUTPATIENT
Start: 2022-02-20 | End: 2022-03-07

## 2022-02-20 RX ORDER — HYDROMORPHONE HYDROCHLORIDE 2 MG/ML
1 INJECTION, SOLUTION INTRAMUSCULAR; INTRAVENOUS; SUBCUTANEOUS
Status: COMPLETED | OUTPATIENT
Start: 2022-02-20 | End: 2022-02-20

## 2022-02-20 RX ORDER — CYCLOBENZAPRINE HCL 10 MG
10 TABLET ORAL 3 TIMES DAILY PRN
Qty: 20 TABLET | Refills: 0 | Status: SHIPPED | OUTPATIENT
Start: 2022-02-20 | End: 2022-02-27

## 2022-02-20 RX ORDER — LIDOCAINE 50 MG/G
1 PATCH TOPICAL
Status: DISCONTINUED | OUTPATIENT
Start: 2022-02-20 | End: 2022-02-20 | Stop reason: HOSPADM

## 2022-02-20 RX ADMIN — LIDOCAINE 1 PATCH: 50 PATCH TOPICAL at 03:02

## 2022-02-20 RX ADMIN — KETOROLAC TROMETHAMINE 30 MG: 30 INJECTION, SOLUTION INTRAMUSCULAR at 03:02

## 2022-02-20 RX ADMIN — HYDROMORPHONE HYDROCHLORIDE 1 MG: 2 INJECTION, SOLUTION INTRAMUSCULAR; INTRAVENOUS; SUBCUTANEOUS at 03:02

## 2022-02-20 NOTE — Clinical Note
"Vicente "Raymondwaylon Greenwood was seen and treated in our emergency department on 2/20/2022.  He may return to work on 02/23/2022.       If you have any questions or concerns, please don't hesitate to call.      Teresita Frias PA-C"

## 2022-02-20 NOTE — DISCHARGE INSTRUCTIONS

## 2022-02-20 NOTE — ED PROVIDER NOTES
Encounter Date: 2/20/2022    SCRIBE #1 NOTE: I, Jamie Cordova, am scribing for, and in the presence of,  Teresita Frias PA-C. I have scribed the following portions of the note - Other sections scribed: HPI, ROS, PE.       History     Chief Complaint   Patient presents with    diffculty walking     Back Pain     Patient reports lower back pain that radiates down bilateral legs, numbness to bilateral legs, and difficulty walking. Patient states that this is an ongoing issue that has progressively worsened over the last 2 weeks. Patient reports that he was just dx with spinal stenosis.     56 y.o. male, with a pertinent past medical history of spinal stenosis of lumbar region with neurogenic claudication presents to the ED with an exacerbation of chronic lumbar back pain that began this morning. Pt rates the pain as a 10/10. Pt denies having any recent trauma or falls. Pt states that the pain is worse with ambulation and moving his legs. Pt reports associated intermittent bilateral leg tingling. Pt had 1 episode of pain shooting from his right foot into his back. Pt has been using a cane due to the pain with ambulation. Pt has received an MRI 10 days ago and has followed up with pain management 4 days ago. Pt sees his neurologist on 04/14. Pt has taken Robaxin and percocet with no relief. No other exacerbating or alleviating factors. Patient denies fever, chills, weakness, numbness, urinary issues, abdominal pain, or other associated symptoms.       The history is provided by the patient. No  was used.     Review of patient's allergies indicates:  No Known Allergies  History reviewed. No pertinent past medical history.  History reviewed. No pertinent surgical history.  History reviewed. No pertinent family history.  Social History     Tobacco Use    Smoking status: Current Every Day Smoker     Packs/day: 0.30     Types: Cigarettes    Smokeless tobacco: Never Used   Substance Use  Topics    Alcohol use: Yes    Drug use: Never     Review of Systems   Constitutional: Negative for chills and fever.   HENT: Negative for congestion, rhinorrhea and sore throat.    Eyes: Negative for visual disturbance.   Respiratory: Negative for cough and shortness of breath.    Cardiovascular: Negative for chest pain.   Gastrointestinal: Negative for abdominal pain, diarrhea, nausea and vomiting.   Genitourinary: Negative for difficulty urinating, dysuria, frequency, hematuria and urgency.   Musculoskeletal: Positive for back pain.        (+) leg tingling  (+) foot pain  (-) numbness   Skin: Negative for rash.   Neurological: Negative for dizziness, weakness and headaches.       Physical Exam     Initial Vitals [02/20/22 1422]   BP Pulse Resp Temp SpO2   135/80 76 16 98.2 °F (36.8 °C) 99 %      MAP       --         Physical Exam    Nursing note and vitals reviewed.  Constitutional: He appears well-developed and well-nourished.   HENT:   Head: Normocephalic and atraumatic.   Eyes: EOM are normal. Pupils are equal, round, and reactive to light.   Neck: Neck supple. No thyromegaly present. No JVD present.   Normal range of motion.  Cardiovascular: Normal rate and regular rhythm. Exam reveals no gallop and no friction rub.    No murmur heard.  Pulses:       Dorsalis pedis pulses are 2+ on the right side and 2+ on the left side.   Pulmonary/Chest: Breath sounds normal. No respiratory distress.   Musculoskeletal:         General: No tenderness or edema. Normal range of motion.      Cervical back: Normal range of motion and neck supple.      Comments: No midline or Paraspinous tenderness. 2+ DP pulses. Pt is ambulatory with cane.     Neurological: He is alert and oriented to person, place, and time. He has normal strength. GCS score is 15. GCS eye subscore is 4. GCS verbal subscore is 5. GCS motor subscore is 6.   Skin: Skin is warm. Capillary refill takes less than 2 seconds.   Psychiatric: He has a normal mood and  "affect. His behavior is normal. Thought content normal.         ED Course   Procedures  Labs Reviewed - No data to display       Imaging Results    None          Medications   ketorolac injection 30 mg (30 mg Intramuscular Given 2/20/22 1550)   HYDROmorphone (PF) injection 1 mg (1 mg Intramuscular Given 2/20/22 1550)     Medical Decision Making:   History:   Old Medical Records: I decided to obtain old medical records.  ED Management:  57 y/o M presenting for evaluation of bilateral sciatica. Saw pain management had MRI on 2/3. Has appointment with NSGY. He reports he "cannot walk" because of his back pain but upon further clarification patient states that his lumbar back pain is worse with movement of his lower extremities.  Patient has normal strength to bilateral lower extremities.  No neurovascular deficits.  No bowel or bladder incontinence, saddle anesthesia.  Considered but doubt cauda equina, epidural abscess or spinal cord compression at this time.  Patient given Toradol IM and Dilaudid IM in the emergency department.  He reports that he did have a severe episode of pain causing him to fall onto his bed earlier today.  Considered but doubt fracture dislocation.  No midline tenderness.  Will have patient follow-up with his neurosurgeon/pain management.  Will have the patient return to the emergency department for worsening symptoms or as needed.          Scribe Attestation:   Scribe #1: I performed the above scribed service and the documentation accurately describes the services I performed. I attest to the accuracy of the note.                 Clinical Impression:   Final diagnoses:  [M54.31, M54.32] Bilateral sciatica (Primary)          ED Disposition Condition    Discharge Stable        ED Prescriptions     Medication Sig Dispense Start Date End Date Auth. Provider    ibuprofen (ADVIL,MOTRIN) 600 MG tablet Take 1 tablet (600 mg total) by mouth every 6 (six) hours as needed for Pain. 20 tablet 2/20/2022 " 2/25/2022 Teresita Frias PA-C    LIDOcaine (LIDODERM) 5 % Place 1 patch onto the skin once daily. Remove & Discard patch within 12 hours or as directed by MD. May use 4% over the counter if not covered by insurance for 15 days 15 patch 2/20/2022 3/7/2022 Teresita Frias PA-C    cyclobenzaprine (FLEXERIL) 10 MG tablet Take 1 tablet (10 mg total) by mouth 3 (three) times daily as needed. 20 tablet 2/20/2022 2/27/2022 Teresita Frias PA-C        Follow-up Information     Follow up With Specialties Details Why Contact Info    Cesar Herrera MD Family Medicine, Wound Care Schedule an appointment as soon as possible for a visit in 2 days for follow up [address] [facility] [address]  [phone]      [facility] Emergency Dept Emergency Medicine Go to  As needed, If symptoms worsen [address] [facility] [address] [phone]        I, Teresita Frias PA-C , personally performed the services described in this documentation. All medical record entries made by the scribe were at my direction and in my presence. I have reviewed the chart and agree that the record reflects my personal performance and is accurate and complete.       Teresita Frias PA-C  02/22/22 1047

## 2022-02-20 NOTE — ED TRIAGE NOTES
Pt. reports he is unable to walk.  pt. reports he was dx with stenosis. Pt. reports he has a scheduled appt with pain management of thurs.

## 2022-02-21 ENCOUNTER — TELEPHONE (OUTPATIENT)
Dept: NEUROSURGERY | Facility: CLINIC | Age: 57
End: 2022-02-21
Payer: COMMERCIAL

## 2022-02-21 NOTE — TELEPHONE ENCOUNTER
Returned pt's call. Pt stated he is in excruciating pain since his appt Friday. Pt stated he is experiencing leg weakness to the point where his legs gave up on him yesterday and he fell, hitting his head which ended up in him having to go to the Ed. Ed suggested he f/u w/ us. Pt stated nothing is really working in terms of meds etc. Pt expressed he wishes to know if he can get PT sooner, if not he will have to move forward w/ Sx.     I mad ept aware I will let Ashly know to give him a call as soon as she can for further medical advice, I also made him aware that I will reach out to dr moreira office to see if we can expedite PT as they were the ones who had ordered it. Pt voiced no other concerns or questions at this time.     ----- Message from Husam Melara sent at 2/21/2022 12:29 PM CST -----  Regarding: Call Back  Contact: patient  Pt. Called stating he has not been able to walk since appt. On 02/18/2022. Pt. Stated he also went to the ER on 02/20/2022.     Requesting an urgent call back.        Pt. @[phone]

## 2022-02-21 NOTE — TELEPHONE ENCOUNTER
Called & spoke to pt again. Stated that I had reached out to ashly and per ashly; she attempted to call hi friday without any success. However, she would like to know if he would like to do a cyst rupture w/ IR when he does his DEBORAH w/ Pain Management, or if he would like to go straight to surgical options.      Pt opted for the cyst rupture first.    Made him aware Ashly would put orders in tomorrow morning.    Pt was very grateful; voiced no other questions or concerns at this moment.

## 2022-02-22 ENCOUNTER — TELEPHONE (OUTPATIENT)
Dept: NEUROSURGERY | Facility: CLINIC | Age: 57
End: 2022-02-22
Payer: COMMERCIAL

## 2022-02-22 DIAGNOSIS — M71.38 SYNOVIAL CYST OF LUMBAR FACET JOINT: ICD-10-CM

## 2022-02-22 DIAGNOSIS — M54.16 LUMBAR RADICULOPATHY: ICD-10-CM

## 2022-02-22 DIAGNOSIS — M48.062 SPINAL STENOSIS OF LUMBAR REGION WITH NEUROGENIC CLAUDICATION: Primary | ICD-10-CM

## 2022-02-22 NOTE — TELEPHONE ENCOUNTER
Called pt and made him aware that dante attempted to place orders in electronically but was unable to do so, so she is placing orders through phone to IR. Mad ept aware if he doesn't hear from IR tomorrow afternoon to give me a call so we can check the status again.    No other concerns voiced.     ----- Message from Kathleen Haro sent at 2/22/2022 12:47 PM CST -----  Regarding: Patient Advice  Contact: Pt [phone]  Patient is calling MEGAN Parsons in regards to the order that was suppose to sent to for cyst removal. Please call. [phone]

## 2022-02-24 ENCOUNTER — TELEPHONE (OUTPATIENT)
Dept: NEUROSURGERY | Facility: CLINIC | Age: 57
End: 2022-02-24
Payer: COMMERCIAL

## 2022-02-24 DIAGNOSIS — M48.062 SPINAL STENOSIS OF LUMBAR REGION WITH NEUROGENIC CLAUDICATION: ICD-10-CM

## 2022-02-24 DIAGNOSIS — M71.38 SYNOVIAL CYST OF LUMBAR FACET JOINT: Primary | ICD-10-CM

## 2022-02-24 NOTE — TELEPHONE ENCOUNTER
----- Message from Mary Carmen Nguyen MA sent at 2/24/2022 11:36 AM CST -----  Contact: self @ [phone]    ----- Message -----  From: Cata Jesus  Sent: 2/24/2022  11:35 AM CST  To: Jaqueline Limon Staff    He says he has not heard from IR concerning the Orders Ashly was going to call in. He says he is in pain and can not walk.  Pls call asap.

## 2022-02-24 NOTE — TELEPHONE ENCOUNTER
Spoke with patient. Informed him of the procedure orders that were placed today after IR reviewed his case. He should expect a call from IR later today to discuss scheduling.       Ashly Parsons PA-C  [facility]  Department of Neurosurgery  [phone]

## 2022-03-02 ENCOUNTER — TELEPHONE (OUTPATIENT)
Dept: NEUROSURGERY | Facility: CLINIC | Age: 57
End: 2022-03-02
Payer: COMMERCIAL

## 2022-03-02 NOTE — TELEPHONE ENCOUNTER
Called pt back. Pt stated he needed to know where to bring his Short Term Disability Paperwork to. Told him he could pass by and drop It off in the office tomorrow; stated to pt we will be out to lunch from 12-1pm but any time before or after then I will be here and will give it to dante to sign.    No other concerns or questions voiced from pt at this moment  ----- Message from Gloria Caraballo sent at 3/2/2022 11:28 AM CST -----  Contact: KATHARINE MONET [[id number]]  Type: Patient Call Back    Who called:KATHARINE MONET [[id number]]    What is the request in detail: The patient would like a call in regards to his short term disability paperwork     Can the clinic reply by [facility]?    Would the patient rather a call back or a response via [facility]?     Best call back number: [phone] (mobile)    Additional Information:

## 2022-03-03 RX ORDER — MIDAZOLAM HYDROCHLORIDE 1 MG/ML
1 INJECTION INTRAMUSCULAR; INTRAVENOUS
Status: CANCELLED | OUTPATIENT
Start: 2022-03-03

## 2022-03-03 RX ORDER — FENTANYL CITRATE 50 UG/ML
50 INJECTION, SOLUTION INTRAMUSCULAR; INTRAVENOUS
Status: CANCELLED | OUTPATIENT
Start: 2022-03-03

## 2022-03-07 ENCOUNTER — TELEPHONE (OUTPATIENT)
Dept: INTERVENTIONAL RADIOLOGY/VASCULAR | Facility: HOSPITAL | Age: 57
End: 2022-03-07
Payer: COMMERCIAL

## 2022-03-08 ENCOUNTER — HOSPITAL ENCOUNTER (OUTPATIENT)
Dept: INTERVENTIONAL RADIOLOGY/VASCULAR | Facility: HOSPITAL | Age: 57
Discharge: HOME | End: 2022-03-08
Attending: PHYSICIAN ASSISTANT
Payer: COMMERCIAL

## 2022-03-08 VITALS
WEIGHT: 180 LBS | DIASTOLIC BLOOD PRESSURE: 85 MMHG | HEART RATE: 61 BPM | OXYGEN SATURATION: 98 % | SYSTOLIC BLOOD PRESSURE: 153 MMHG | RESPIRATION RATE: 15 BRPM | BODY MASS INDEX: 31.89 KG/M2 | TEMPERATURE: 98 F | HEIGHT: 63 IN

## 2022-03-08 DIAGNOSIS — M71.30 SYNOVIAL CYST: ICD-10-CM

## 2022-03-08 DIAGNOSIS — M71.38 SYNOVIAL CYST OF LUMBAR FACET JOINT: ICD-10-CM

## 2022-03-08 DIAGNOSIS — M48.062 SPINAL STENOSIS OF LUMBAR REGION WITH NEUROGENIC CLAUDICATION: ICD-10-CM

## 2022-03-08 PROCEDURE — 63600175 PHARM REV CODE 636 W HCPCS: Performed by: RADIOLOGY

## 2022-03-08 PROCEDURE — 64493 IR FACET INJ LUMBAR 1ST VERT UNI: ICD-10-PCS | Mod: 50,,, | Performed by: RADIOLOGY

## 2022-03-08 PROCEDURE — A4550 SURGICAL TRAYS: HCPCS

## 2022-03-08 PROCEDURE — 64493 INJ PARAVERT F JNT L/S 1 LEV: CPT | Mod: 50 | Performed by: RADIOLOGY

## 2022-03-08 PROCEDURE — 25000003 PHARM REV CODE 250: Performed by: STUDENT IN AN ORGANIZED HEALTH CARE EDUCATION/TRAINING PROGRAM

## 2022-03-08 RX ORDER — SODIUM CHLORIDE 9 MG/ML
INJECTION, SOLUTION INTRAVENOUS CONTINUOUS
Status: DISCONTINUED | OUTPATIENT
Start: 2022-03-08 | End: 2022-03-09 | Stop reason: HOSPADM

## 2022-03-08 RX ORDER — LIDOCAINE HYDROCHLORIDE 10 MG/ML
INJECTION INFILTRATION; PERINEURAL CODE/TRAUMA/SEDATION MEDICATION
Status: COMPLETED | OUTPATIENT
Start: 2022-03-08 | End: 2022-03-08

## 2022-03-08 RX ORDER — ONDANSETRON 2 MG/ML
4 INJECTION INTRAMUSCULAR; INTRAVENOUS EVERY 6 HOURS PRN
Status: CANCELLED | OUTPATIENT
Start: 2022-03-08

## 2022-03-08 RX ORDER — MIDAZOLAM HYDROCHLORIDE 1 MG/ML
INJECTION INTRAMUSCULAR; INTRAVENOUS CODE/TRAUMA/SEDATION MEDICATION
Status: COMPLETED | OUTPATIENT
Start: 2022-03-08 | End: 2022-03-08

## 2022-03-08 RX ORDER — METHYLPREDNISOLONE ACETATE 40 MG/ML
INJECTION, SUSPENSION INTRA-ARTICULAR; INTRALESIONAL; INTRAMUSCULAR; SOFT TISSUE CODE/TRAUMA/SEDATION MEDICATION
Status: COMPLETED | OUTPATIENT
Start: 2022-03-08 | End: 2022-03-08

## 2022-03-08 RX ORDER — FENTANYL CITRATE 50 UG/ML
INJECTION, SOLUTION INTRAMUSCULAR; INTRAVENOUS CODE/TRAUMA/SEDATION MEDICATION
Status: COMPLETED | OUTPATIENT
Start: 2022-03-08 | End: 2022-03-08

## 2022-03-08 RX ADMIN — FENTANYL CITRATE 25 MCG: 50 INJECTION, SOLUTION INTRAMUSCULAR; INTRAVENOUS at 10:03

## 2022-03-08 RX ADMIN — LIDOCAINE HYDROCHLORIDE 5 ML: 10 INJECTION, SOLUTION INFILTRATION; PERINEURAL at 10:03

## 2022-03-08 RX ADMIN — MIDAZOLAM HYDROCHLORIDE 0.5 MG: 1 INJECTION INTRAMUSCULAR; INTRAVENOUS at 10:03

## 2022-03-08 RX ADMIN — METHYLPREDNISOLONE ACETATE 40 MG: 40 INJECTION, SUSPENSION INTRA-ARTICULAR; INTRALESIONAL; INTRAMUSCULAR; SOFT TISSUE at 10:03

## 2022-03-08 RX ADMIN — MIDAZOLAM HYDROCHLORIDE 1 MG: 1 INJECTION INTRAMUSCULAR; INTRAVENOUS at 10:03

## 2022-03-08 RX ADMIN — FENTANYL CITRATE 50 MCG: 50 INJECTION, SOLUTION INTRAMUSCULAR; INTRAVENOUS at 10:03

## 2022-03-08 NOTE — DISCHARGE INSTRUCTIONS
For scheduling: Call [phone]    For questions or concerns call: RAHEEL MON-FRI 8 AM- 5PM [phone]. Radiology resident on call [phone].    For immediate concerns that are not emergent, you may call our radiology clinic at: [phone]

## 2022-03-08 NOTE — PLAN OF CARE
Pt arrived to IR 4-200 for spinal cyst rupture. Pt oriented to unit and staff. Plan of care reviewed with patient. Comfort measures utilized. Pt safely transferred from stretcher to procedural table. Safety strap applied, positioner pillows utilized to minimize pressure points. Blankets applied. Patient placed on continuous monitoring. RN to remain at bedside. Education accepted. Consents reviewed. See flow sheets for monitoring, medication administration, and updates.
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
## 2022-03-08 NOTE — PLAN OF CARE
Patient arrived to room. PIV placed, labs sent. Admit assessment completed. Plan of care discussed with patient. Will monitor. Call light within reach, instructed in use

## 2022-03-08 NOTE — PLAN OF CARE
Patient tolerated procedure well. VSS throughout. Patient to MPU 5; report given to Cynthia RODRIGUEZ at bedside.

## 2022-03-08 NOTE — PROCEDURES
Radiology Post-Procedure Note    Pre Op Diagnosis: LBP - lumbar radiculopathy     Post Op Diagnosis: Same    Procedure: Lumbar Facet    Procedure performed by: Adilson Grace MD    Written Informed Consent Obtained: Yes    Specimen Removed: NO    Estimated Blood Loss: Minimal    Findings:     No clear / significant opacification of the left L4-5 synovial cyst identified in this injection - No absolute radiographic interventional needs.     Performed bilateral L4-5 facet joint injections for symptomatology control     Level injected: L4-L5  Needle used: 22 gauge  Dose:  40 mg Depo-methylprednisolone   2 mL Lidocaine 1% MPF    Patient tolerated procedure well.        Anita Hatch MD     Neuro Endovascular Surgery Fellow   [facility]

## 2022-03-08 NOTE — PLAN OF CARE
Patient discharged to meet ride. Patient AAOx3, no distress noted, respirations even and unlabored. Patient denies bleeding, pain, dizziness, or any new symptoms.   Vitals:    03/08/22 1115   BP: (!) 153/85   Pulse: 61   Resp: 15   Temp:

## 2022-03-14 ENCOUNTER — TELEPHONE (OUTPATIENT)
Dept: NEUROSURGERY | Facility: CLINIC | Age: 57
End: 2022-03-14
Payer: COMMERCIAL

## 2022-03-14 NOTE — TELEPHONE ENCOUNTER
Called pt back and notified him that it has been faxed to [facility]. No further concerns or questions voiced.   ----- Message from Shaye Hilton sent at 3/14/2022  2:47 PM CDT -----  Type: Patient Call Back    Who called: self    What is the request in detail: patient fax number to his job is [phone]    Can the clinic reply by [facility]? No     Would the patient rather a call back or a response via [facility]?  call    Best call back number:.[phone] (home)

## 2022-03-14 NOTE — TELEPHONE ENCOUNTER
Pt call office back. 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 Pt got audibly upset stating that wasn't the correct number; he stated he had a post it note with his work's number when he handed it to me. I made him aware that once I received the paperwork back; there was not any post-it. Requested for pt to pls give it to me again and ill be more than happy to refax it. Pt stated he will call back with the fax for his work.

## 2022-03-14 NOTE — TELEPHONE ENCOUNTER
Returned pt's call; no answer, pt picked up then hung up. Unable to leave message.      Paperwork was faxed last week.     ----- Message from Cata Jesus sent at 3/14/2022  2:21 PM CDT -----  Contact: self @ [phone]  Pt is calling for the status of his disability paper work that was dropped off and was suppose to be faxed to his employer last Monday.  Pls call.

## 2022-03-16 ENCOUNTER — TELEPHONE (OUTPATIENT)
Dept: NEUROSURGERY | Facility: CLINIC | Age: 57
End: 2022-03-16
Payer: COMMERCIAL

## 2022-03-16 NOTE — TELEPHONE ENCOUNTER
"Returned geneva's call. She stated she had a few questions concerning pt's FMLA. She stated that pt states he is unable to walk and can not return to work at this moment. However she stated the paperwork states he can return with light duty; but contradicts when it states length of condition. She also stated it stated when it states until what time condition will last, it states "unknown"  & that needs to specified an estimated time frame for paperwork to be submitted. She also needs to know how long pt will be out bc of PT & Pain Mngmt Procedure.    Made her aware that dante was out of the office today and I will make her aware of this tomorrow when I see her and update her with what she says tomorrow.   ----- Message from Yin Tolliver sent at 3/16/2022  3:07 PM CDT -----  Regarding: geneva - pt employer  Type: Patient Call Back       What is the request in detail:  Geneva calling to speak  to a nurse regarding pt FMLA      Can the clinic reply by [facility]? No       Would the patient rather a call back or a response via [facility]? Call back       Best call back number: [phone]      Thank you.        "

## 2022-03-17 ENCOUNTER — TELEPHONE (OUTPATIENT)
Dept: NEUROSURGERY | Facility: CLINIC | Age: 57
End: 2022-03-17
Payer: COMMERCIAL

## 2022-03-17 NOTE — TELEPHONE ENCOUNTER
Per Ashly: DARIEL Knight MA  Caller: Unspecified (Yesterday,  3:37 PM)  I provided one week off of work following the IR procedure. If his pain is improving to at least 5/10, he can return to work on light duty while he attends PT. If pain has not improved to at least 5/10 and he does not think he can participate in PT or go to work, then he needs to return to clinic to discuss surgical options. If we are considering surgery options then I would be able to fill out of new form with a firm date to correlate with the surgery date. If he is not able to work/go to PT and is not interested in surgery, then he should request the fmla paperwork from his pcp.     The date is listed as unknown because it is dependent on the above treatment efficacies, which I cannot determine after one clinic visit.     Thanks,   Ashly       I returned Geneva's call stating the above word for word. I confirmed that pt did have procedure with IR last week on the 8th of March. And per ashly, he was given 1 week off of work after the procedure. If after the procedure and that one week his pain is 5 or less he can return to work on light duty. If his pain is 5+, he needs to return to clinic to discuss sx options.    Geneva voiced understanding. She stated she will call pt and notify him that he needs to contact us if he is in pain to schedule f/u. I agreed to this plan, and will explain what Ashly stated to pt as well.

## 2022-03-17 NOTE — TELEPHONE ENCOUNTER
Called & lvm for Ms. Geneva andino; director of HR of [facility] [employer]. No answer, left message stating that I have an update from Ashly regarding Mr. Greenwood's paperwork as discussed yesterday. Requested call back. Provided office number and ext.

## 2022-03-18 ENCOUNTER — HOSPITAL ENCOUNTER (INPATIENT)
Facility: HOSPITAL | Age: 57
LOS: 5 days | Discharge: NURSING, REHAB OR LONG-TERM CARE | DRG: 472 | End: 2022-03-23
Attending: EMERGENCY MEDICINE | Admitting: NEUROLOGICAL SURGERY
Payer: COMMERCIAL

## 2022-03-18 ENCOUNTER — HOSPITAL ENCOUNTER (OUTPATIENT)
Dept: RADIOLOGY | Facility: HOSPITAL | Age: 57
Discharge: HOME | DRG: 472 | End: 2022-03-18
Attending: PHYSICIAN ASSISTANT
Payer: COMMERCIAL

## 2022-03-18 ENCOUNTER — TELEPHONE (OUTPATIENT)
Dept: NEUROSURGERY | Facility: HOSPITAL | Age: 57
End: 2022-03-18
Payer: COMMERCIAL

## 2022-03-18 ENCOUNTER — OFFICE VISIT (OUTPATIENT)
Dept: NEUROSURGERY | Facility: CLINIC | Age: 57
End: 2022-03-18
Payer: COMMERCIAL

## 2022-03-18 VITALS
BODY MASS INDEX: 32.23 KG/M2 | DIASTOLIC BLOOD PRESSURE: 78 MMHG | SYSTOLIC BLOOD PRESSURE: 161 MMHG | HEIGHT: 63 IN | OXYGEN SATURATION: 93 % | HEART RATE: 86 BPM | WEIGHT: 181.88 LBS

## 2022-03-18 DIAGNOSIS — M47.12 CERVICAL SPONDYLOSIS WITH MYELOPATHY AND RADICULOPATHY: ICD-10-CM

## 2022-03-18 DIAGNOSIS — R20.0 NUMBNESS AND TINGLING OF BOTH UPPER EXTREMITIES: ICD-10-CM

## 2022-03-18 DIAGNOSIS — R20.0 NUMBNESS OF BOTH LOWER EXTREMITIES: ICD-10-CM

## 2022-03-18 DIAGNOSIS — Z01.818 PRE-OP EVALUATION: ICD-10-CM

## 2022-03-18 DIAGNOSIS — G95.9 MYELOPATHY: ICD-10-CM

## 2022-03-18 DIAGNOSIS — R29.898 WEAKNESS OF RIGHT FOOT: ICD-10-CM

## 2022-03-18 DIAGNOSIS — G95.9 MYELOPATHY: Primary | ICD-10-CM

## 2022-03-18 DIAGNOSIS — R26.9 GAIT DISTURBANCE: ICD-10-CM

## 2022-03-18 DIAGNOSIS — R29.898 WEAKNESS OF BOTH HANDS: ICD-10-CM

## 2022-03-18 DIAGNOSIS — Z01.818 PREOPERATIVE CLEARANCE: ICD-10-CM

## 2022-03-18 DIAGNOSIS — M47.22 CERVICAL SPONDYLOSIS WITH MYELOPATHY AND RADICULOPATHY: ICD-10-CM

## 2022-03-18 DIAGNOSIS — R20.2 NUMBNESS AND TINGLING OF BOTH UPPER EXTREMITIES: ICD-10-CM

## 2022-03-18 DIAGNOSIS — M47.816 LUMBAR SPONDYLOSIS: Primary | ICD-10-CM

## 2022-03-18 LAB
ABO + RH BLD: NORMAL
ALBUMIN SERPL BCP-MCNC: 4 G/DL (ref 3.5–5.2)
ALP SERPL-CCNC: 54 U/L (ref 55–135)
ALT SERPL W/O P-5'-P-CCNC: 32 U/L (ref 10–44)
ANION GAP SERPL CALC-SCNC: 12 MMOL/L (ref 8–16)
APTT BLDCRRT: 27.9 SEC (ref 21–32)
AST SERPL-CCNC: 21 U/L (ref 10–40)
BASOPHILS # BLD AUTO: 0.07 K/UL (ref 0–0.2)
BASOPHILS NFR BLD: 0.7 % (ref 0–1.9)
BILIRUB SERPL-MCNC: 0.5 MG/DL (ref 0.1–1)
BLD GP AB SCN CELLS X3 SERPL QL: NORMAL
BUN SERPL-MCNC: 10 MG/DL (ref 6–20)
CALCIUM SERPL-MCNC: 9.7 MG/DL (ref 8.7–10.5)
CHLORIDE SERPL-SCNC: 102 MMOL/L (ref 95–110)
CO2 SERPL-SCNC: 23 MMOL/L (ref 23–29)
CREAT SERPL-MCNC: 0.8 MG/DL (ref 0.5–1.4)
DIFFERENTIAL METHOD: ABNORMAL
EOSINOPHIL # BLD AUTO: 0.1 K/UL (ref 0–0.5)
EOSINOPHIL NFR BLD: 1.2 % (ref 0–8)
ERYTHROCYTE [DISTWIDTH] IN BLOOD BY AUTOMATED COUNT: 13.4 % (ref 11.5–14.5)
EST. GFR  (AFRICAN AMERICAN): >60 ML/MIN/1.73 M^2
EST. GFR  (NON AFRICAN AMERICAN): >60 ML/MIN/1.73 M^2
GLUCOSE SERPL-MCNC: 132 MG/DL (ref 70–110)
HCT VFR BLD AUTO: 41.6 % (ref 40–54)
HGB BLD-MCNC: 13.6 G/DL (ref 14–18)
IMM GRANULOCYTES # BLD AUTO: 0.03 K/UL (ref 0–0.04)
IMM GRANULOCYTES NFR BLD AUTO: 0.3 % (ref 0–0.5)
INR PPP: 1 (ref 0.8–1.2)
LYMPHOCYTES # BLD AUTO: 3.1 K/UL (ref 1–4.8)
LYMPHOCYTES NFR BLD: 30.9 % (ref 18–48)
MCH RBC QN AUTO: 28.3 PG (ref 27–31)
MCHC RBC AUTO-ENTMCNC: 32.7 G/DL (ref 32–36)
MCV RBC AUTO: 87 FL (ref 82–98)
MONOCYTES # BLD AUTO: 0.8 K/UL (ref 0.3–1)
MONOCYTES NFR BLD: 7.9 % (ref 4–15)
NEUTROPHILS # BLD AUTO: 5.9 K/UL (ref 1.8–7.7)
NEUTROPHILS NFR BLD: 59 % (ref 38–73)
NRBC BLD-RTO: 0 /100 WBC
PLATELET # BLD AUTO: 369 K/UL (ref 150–450)
PMV BLD AUTO: 8.7 FL (ref 9.2–12.9)
POTASSIUM SERPL-SCNC: 4.2 MMOL/L (ref 3.5–5.1)
PROT SERPL-MCNC: 7.3 G/DL (ref 6–8.4)
PROTHROMBIN TIME: 10.9 SEC (ref 9–12.5)
RBC # BLD AUTO: 4.8 M/UL (ref 4.6–6.2)
SARS-COV-2 RDRP RESP QL NAA+PROBE: NEGATIVE
SODIUM SERPL-SCNC: 137 MMOL/L (ref 136–145)
WBC # BLD AUTO: 9.96 K/UL (ref 3.9–12.7)

## 2022-03-18 PROCEDURE — 3008F PR BODY MASS INDEX (BMI) DOCUMENTED: ICD-10-PCS | Mod: CPTII,S$GLB,, | Performed by: PHYSICIAN ASSISTANT

## 2022-03-18 PROCEDURE — 3008F BODY MASS INDEX DOCD: CPT | Mod: CPTII,S$GLB,, | Performed by: PHYSICIAN ASSISTANT

## 2022-03-18 PROCEDURE — 99285 EMERGENCY DEPT VISIT HI MDM: CPT | Mod: 25

## 2022-03-18 PROCEDURE — 99215 PR OFFICE/OUTPT VISIT, EST, LEVL V, 40-54 MIN: ICD-10-PCS | Mod: S$GLB,,, | Performed by: PHYSICIAN ASSISTANT

## 2022-03-18 PROCEDURE — 99999 PR PBB SHADOW E&M-EST. PATIENT-LVL IV: ICD-10-PCS | Mod: PBBFAC,,, | Performed by: PHYSICIAN ASSISTANT

## 2022-03-18 PROCEDURE — 1160F RVW MEDS BY RX/DR IN RCRD: CPT | Mod: CPTII,S$GLB,, | Performed by: PHYSICIAN ASSISTANT

## 2022-03-18 PROCEDURE — 99222 1ST HOSP IP/OBS MODERATE 55: CPT | Mod: ,,, | Performed by: NEUROLOGICAL SURGERY

## 2022-03-18 PROCEDURE — 85610 PROTHROMBIN TIME: CPT | Performed by: STUDENT IN AN ORGANIZED HEALTH CARE EDUCATION/TRAINING PROGRAM

## 2022-03-18 PROCEDURE — 72141 MRI NECK SPINE W/O DYE: CPT | Mod: 26,,, | Performed by: RADIOLOGY

## 2022-03-18 PROCEDURE — 85025 COMPLETE CBC W/AUTO DIFF WBC: CPT | Performed by: STUDENT IN AN ORGANIZED HEALTH CARE EDUCATION/TRAINING PROGRAM

## 2022-03-18 PROCEDURE — 72141 MRI NECK SPINE W/O DYE: CPT | Mod: TC,PO

## 2022-03-18 PROCEDURE — 80053 COMPREHEN METABOLIC PANEL: CPT | Performed by: STUDENT IN AN ORGANIZED HEALTH CARE EDUCATION/TRAINING PROGRAM

## 2022-03-18 PROCEDURE — 99284 EMERGENCY DEPT VISIT MOD MDM: CPT | Mod: CS,,, | Performed by: EMERGENCY MEDICINE

## 2022-03-18 PROCEDURE — 85730 THROMBOPLASTIN TIME PARTIAL: CPT | Performed by: STUDENT IN AN ORGANIZED HEALTH CARE EDUCATION/TRAINING PROGRAM

## 2022-03-18 PROCEDURE — 86901 BLOOD TYPING SEROLOGIC RH(D): CPT | Performed by: STUDENT IN AN ORGANIZED HEALTH CARE EDUCATION/TRAINING PROGRAM

## 2022-03-18 PROCEDURE — U0002 COVID-19 LAB TEST NON-CDC: HCPCS | Performed by: STUDENT IN AN ORGANIZED HEALTH CARE EDUCATION/TRAINING PROGRAM

## 2022-03-18 PROCEDURE — 99222 PR INITIAL HOSPITAL CARE,LEVL II: ICD-10-PCS | Mod: ,,, | Performed by: NEUROLOGICAL SURGERY

## 2022-03-18 PROCEDURE — 3078F PR MOST RECENT DIASTOLIC BLOOD PRESSURE < 80 MM HG: ICD-10-PCS | Mod: CPTII,S$GLB,, | Performed by: PHYSICIAN ASSISTANT

## 2022-03-18 PROCEDURE — 1160F PR REVIEW ALL MEDS BY PRESCRIBER/CLIN PHARMACIST DOCUMENTED: ICD-10-PCS | Mod: CPTII,S$GLB,, | Performed by: PHYSICIAN ASSISTANT

## 2022-03-18 PROCEDURE — 3078F DIAST BP <80 MM HG: CPT | Mod: CPTII,S$GLB,, | Performed by: PHYSICIAN ASSISTANT

## 2022-03-18 PROCEDURE — 3077F SYST BP >= 140 MM HG: CPT | Mod: CPTII,S$GLB,, | Performed by: PHYSICIAN ASSISTANT

## 2022-03-18 PROCEDURE — 99999 PR PBB SHADOW E&M-EST. PATIENT-LVL IV: CPT | Mod: PBBFAC,,, | Performed by: PHYSICIAN ASSISTANT

## 2022-03-18 PROCEDURE — 1159F PR MEDICATION LIST DOCUMENTED IN MEDICAL RECORD: ICD-10-PCS | Mod: CPTII,S$GLB,, | Performed by: PHYSICIAN ASSISTANT

## 2022-03-18 PROCEDURE — 3077F PR MOST RECENT SYSTOLIC BLOOD PRESSURE >= 140 MM HG: ICD-10-PCS | Mod: CPTII,S$GLB,, | Performed by: PHYSICIAN ASSISTANT

## 2022-03-18 PROCEDURE — 72141 MRI CERVICAL SPINE WITHOUT CONTRAST: ICD-10-PCS | Mod: 26,,, | Performed by: RADIOLOGY

## 2022-03-18 PROCEDURE — 99215 OFFICE O/P EST HI 40 MIN: CPT | Mod: S$GLB,,, | Performed by: PHYSICIAN ASSISTANT

## 2022-03-18 PROCEDURE — 99284 PR EMERGENCY DEPT VISIT,LEVEL IV: ICD-10-PCS | Mod: CS,,, | Performed by: EMERGENCY MEDICINE

## 2022-03-18 PROCEDURE — 11000001 HC ACUTE MED/SURG PRIVATE ROOM

## 2022-03-18 PROCEDURE — 1159F MED LIST DOCD IN RCRD: CPT | Mod: CPTII,S$GLB,, | Performed by: PHYSICIAN ASSISTANT

## 2022-03-18 RX ORDER — IBUPROFEN 200 MG
16 TABLET ORAL
Status: DISCONTINUED | OUTPATIENT
Start: 2022-03-18 | End: 2022-03-23 | Stop reason: HOSPADM

## 2022-03-18 RX ORDER — GLUCAGON 1 MG
1 KIT INJECTION
Status: DISCONTINUED | OUTPATIENT
Start: 2022-03-18 | End: 2022-03-23 | Stop reason: HOSPADM

## 2022-03-18 RX ORDER — SODIUM CHLORIDE 9 MG/ML
INJECTION, SOLUTION INTRAVENOUS CONTINUOUS
Status: DISCONTINUED | OUTPATIENT
Start: 2022-03-19 | End: 2022-03-19

## 2022-03-18 RX ORDER — LABETALOL HYDROCHLORIDE 5 MG/ML
20 INJECTION, SOLUTION INTRAVENOUS EVERY 4 HOURS PRN
Status: DISCONTINUED | OUTPATIENT
Start: 2022-03-18 | End: 2022-03-20

## 2022-03-18 RX ORDER — IBUPROFEN 200 MG
24 TABLET ORAL
Status: DISCONTINUED | OUTPATIENT
Start: 2022-03-18 | End: 2022-03-23 | Stop reason: HOSPADM

## 2022-03-18 RX ORDER — HYDRALAZINE HYDROCHLORIDE 20 MG/ML
10 INJECTION INTRAMUSCULAR; INTRAVENOUS EVERY 6 HOURS PRN
Status: DISCONTINUED | OUTPATIENT
Start: 2022-03-18 | End: 2022-03-21

## 2022-03-18 RX ORDER — CAPSAICIN 0.03 G/100G
CREAM TOPICAL 2 TIMES DAILY
Status: DISCONTINUED | OUTPATIENT
Start: 2022-03-18 | End: 2022-03-18

## 2022-03-18 NOTE — LETTER
March 18, 2022      [facility] - Neurosurgery  [address]   [place] [address]  Phone: [phone]  Fax: [phone]       Patient: Vicente Greenwood   YOB: 1965  Date of Visit: 03/18/2022    To Whom It May Concern:    Chente Greenwood  was at [facility] on 03/18/2022. The patient may not return to work until full work-up is completed regarding patients severe symptoms. If you have any questions or concerns, or if I can be of further assistance, please do not hesitate to contact me.    Sincerely,          Mary Carmen Nguyen MA
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
## 2022-03-18 NOTE — ED NOTES
LOC: The patient is awake, alert, and oriented to self, place, time, and situation. Pt is calm and cooperative. Affect is appropriate.  Speech is appropriate and clear.     APPEARANCE: Patient resting comfortably, pt instucted to come to the ED following MRI , pt states he is not in pain, reports knee weakness.   Patient is clean and well groomed.    SKIN: The skin is warm and dry; color consistent with ethnicity.  Patient has normal skin turgor and moist mucus membranes.  Skin intact; no breakdown or bruising noted.     MUSCULOSKELETAL: Patient moving upper and lower extremities with difficulty.no pain at this time,recent steroid injection.  Knee weakness, difficulty turning reported.     RESPIRATORY: Airway is open and patent. Respirations spontaneous, even, easy, and non-labored.  Patient has a normal effort and rate.  No accessory muscle use noted. Denies cough.     CARDIAC:   No peripheral edema noted. No complaints of chest pain.      ABDOMEN: Soft and non tender to palpation.  No distention noted. Pt denies abdominal pain; denies nausea, vomiting, diarrhea, or constipation.    NEUROLOGIC: Eyes open spontaneously.  Behavior appropriate to situation.  Follows commands; facial expression symmetrical.  Purposeful motor response noted; normal sensation in all extremities. Pt denies headache; denies lightheadedness or dizziness; denies visual disturbances; denies loss of balance; denies unilateral weakness.
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
## 2022-03-18 NOTE — ED NOTES
Ashly Parsons PA-C     3/18/22 2:26 PM  Note  STAT cervical MRI results reviewed. Severe compression with myelomalacia at C3-4 explain his acute myelopathy. Advised to go to the ED at [facility]. On call neurosurgery informed. Patient is in agreement with the plan and will get a ride to the ED today.

## 2022-03-18 NOTE — TELEPHONE ENCOUNTER
STAT cervical MRI results reviewed. Severe compression with myelomalacia at C3-4 explain his acute myelopathy. Advised to go to the ED at [facility]. On call neurosurgery informed. Patient is in agreement with the plan and will get a ride to the ED today.       Ashly Parsons PA-C  [facility]  Department of Neurosurgery  [phone]

## 2022-03-19 ENCOUNTER — ANESTHESIA EVENT (OUTPATIENT)
Dept: SURGERY | Facility: HOSPITAL | Age: 57
DRG: 472 | End: 2022-03-19
Payer: COMMERCIAL

## 2022-03-19 PROBLEM — Z78.9 ALCOHOL USE: Status: ACTIVE | Noted: 2022-03-19

## 2022-03-19 PROBLEM — M47.12 CERVICAL SPONDYLOSIS WITH MYELOPATHY AND RADICULOPATHY: Status: ACTIVE | Noted: 2022-03-19

## 2022-03-19 PROBLEM — F17.200 TOBACCO DEPENDENCE: Status: ACTIVE | Noted: 2022-03-19

## 2022-03-19 PROBLEM — Z01.818 PREOPERATIVE CLEARANCE: Status: ACTIVE | Noted: 2022-03-19

## 2022-03-19 PROBLEM — M47.22 CERVICAL SPONDYLOSIS WITH MYELOPATHY AND RADICULOPATHY: Status: ACTIVE | Noted: 2022-03-19

## 2022-03-19 LAB
ANION GAP SERPL CALC-SCNC: 8 MMOL/L (ref 8–16)
BASOPHILS # BLD AUTO: 0.04 K/UL (ref 0–0.2)
BASOPHILS NFR BLD: 0.5 % (ref 0–1.9)
BUN SERPL-MCNC: 7 MG/DL (ref 6–20)
CALCIUM SERPL-MCNC: 9.1 MG/DL (ref 8.7–10.5)
CHLORIDE SERPL-SCNC: 104 MMOL/L (ref 95–110)
CO2 SERPL-SCNC: 25 MMOL/L (ref 23–29)
CREAT SERPL-MCNC: 0.7 MG/DL (ref 0.5–1.4)
DIFFERENTIAL METHOD: ABNORMAL
EOSINOPHIL # BLD AUTO: 0.1 K/UL (ref 0–0.5)
EOSINOPHIL NFR BLD: 1.6 % (ref 0–8)
ERYTHROCYTE [DISTWIDTH] IN BLOOD BY AUTOMATED COUNT: 13.4 % (ref 11.5–14.5)
EST. GFR  (AFRICAN AMERICAN): >60 ML/MIN/1.73 M^2
EST. GFR  (NON AFRICAN AMERICAN): >60 ML/MIN/1.73 M^2
GLUCOSE SERPL-MCNC: 91 MG/DL (ref 70–110)
HCT VFR BLD AUTO: 40.7 % (ref 40–54)
HGB BLD-MCNC: 12.9 G/DL (ref 14–18)
IMM GRANULOCYTES # BLD AUTO: 0.02 K/UL (ref 0–0.04)
IMM GRANULOCYTES NFR BLD AUTO: 0.2 % (ref 0–0.5)
LYMPHOCYTES # BLD AUTO: 3.6 K/UL (ref 1–4.8)
LYMPHOCYTES NFR BLD: 42.1 % (ref 18–48)
MCH RBC QN AUTO: 28 PG (ref 27–31)
MCHC RBC AUTO-ENTMCNC: 31.7 G/DL (ref 32–36)
MCV RBC AUTO: 89 FL (ref 82–98)
MONOCYTES # BLD AUTO: 0.8 K/UL (ref 0.3–1)
MONOCYTES NFR BLD: 9 % (ref 4–15)
NEUTROPHILS # BLD AUTO: 4 K/UL (ref 1.8–7.7)
NEUTROPHILS NFR BLD: 46.6 % (ref 38–73)
NRBC BLD-RTO: 0 /100 WBC
PLATELET # BLD AUTO: 344 K/UL (ref 150–450)
PMV BLD AUTO: 8.8 FL (ref 9.2–12.9)
POTASSIUM SERPL-SCNC: 3.8 MMOL/L (ref 3.5–5.1)
RBC # BLD AUTO: 4.6 M/UL (ref 4.6–6.2)
SODIUM SERPL-SCNC: 137 MMOL/L (ref 136–145)
WBC # BLD AUTO: 8.58 K/UL (ref 3.9–12.7)

## 2022-03-19 PROCEDURE — 99233 SBSQ HOSP IP/OBS HIGH 50: CPT | Mod: 57,,, | Performed by: NEUROLOGICAL SURGERY

## 2022-03-19 PROCEDURE — 93010 ELECTROCARDIOGRAM REPORT: CPT | Mod: ,,, | Performed by: INTERNAL MEDICINE

## 2022-03-19 PROCEDURE — 93005 ELECTROCARDIOGRAM TRACING: CPT

## 2022-03-19 PROCEDURE — 36415 COLL VENOUS BLD VENIPUNCTURE: CPT | Performed by: NEUROLOGICAL SURGERY

## 2022-03-19 PROCEDURE — 80048 BASIC METABOLIC PNL TOTAL CA: CPT | Performed by: STUDENT IN AN ORGANIZED HEALTH CARE EDUCATION/TRAINING PROGRAM

## 2022-03-19 PROCEDURE — 99223 PR INITIAL HOSPITAL CARE,LEVL III: ICD-10-PCS | Mod: 25,,, | Performed by: HOSPITALIST

## 2022-03-19 PROCEDURE — 36415 COLL VENOUS BLD VENIPUNCTURE: CPT | Performed by: STUDENT IN AN ORGANIZED HEALTH CARE EDUCATION/TRAINING PROGRAM

## 2022-03-19 PROCEDURE — 93010 EKG 12-LEAD: ICD-10-PCS | Mod: ,,, | Performed by: INTERNAL MEDICINE

## 2022-03-19 PROCEDURE — 99233 PR SUBSEQUENT HOSPITAL CARE,LEVL III: ICD-10-PCS | Mod: 57,,, | Performed by: NEUROLOGICAL SURGERY

## 2022-03-19 PROCEDURE — 99223 1ST HOSP IP/OBS HIGH 75: CPT | Mod: 25,,, | Performed by: HOSPITALIST

## 2022-03-19 PROCEDURE — 99406 PR TOBACCO USE CESSATION INTERMEDIATE 3-10 MINUTES: ICD-10-PCS | Mod: ,,, | Performed by: HOSPITALIST

## 2022-03-19 PROCEDURE — 25000003 PHARM REV CODE 250: Performed by: STUDENT IN AN ORGANIZED HEALTH CARE EDUCATION/TRAINING PROGRAM

## 2022-03-19 PROCEDURE — 99406 BEHAV CHNG SMOKING 3-10 MIN: CPT | Mod: ,,, | Performed by: HOSPITALIST

## 2022-03-19 PROCEDURE — 85025 COMPLETE CBC W/AUTO DIFF WBC: CPT | Performed by: STUDENT IN AN ORGANIZED HEALTH CARE EDUCATION/TRAINING PROGRAM

## 2022-03-19 PROCEDURE — 11000001 HC ACUTE MED/SURG PRIVATE ROOM

## 2022-03-19 RX ORDER — SODIUM CHLORIDE 9 MG/ML
INJECTION, SOLUTION INTRAVENOUS CONTINUOUS
Status: DISCONTINUED | OUTPATIENT
Start: 2022-03-20 | End: 2022-03-21

## 2022-03-19 RX ADMIN — SODIUM CHLORIDE: 0.9 INJECTION, SOLUTION INTRAVENOUS at 12:03

## 2022-03-19 NOTE — HPI
55 yo male with known history of lumbar spondylosis recently referred for DEBORAH on 3/8/22. Pt says following procedure, he had full resolution of related radicular symptoms but acutely developed signficant gait imbalance requiring a walker to ambulate. Pt does not complain subjectively of focal weakness, and says that since event symptoms have not worsened but intermittently improved and relapsed to current baseline. Pt was seen and assessed in clinic today and referred to ED for evaluation. No bowel or bladder symptoms. Pt has no other medical history and take no home medications.

## 2022-03-19 NOTE — PLAN OF CARE
Problem: Adult Inpatient Plan of Care  Goal: Plan of Care Review  Outcome: Ongoing, Progressing  Goal: Absence of Hospital-Acquired Illness or Injury  Outcome: Ongoing, Progressing  Goal: Optimal Comfort and Wellbeing  Outcome: Ongoing, Progressing     Problem: Fall Injury Risk  Goal: Absence of Fall and Fall-Related Injury  Outcome: Ongoing, Progressing     POC reviewed with pt. Pt verbalized understanding. VSS. Full assessment in flowsheets. Bed locked in lowest position. Bed alarm set. Call light within reach. NPO at midnight. Surgery consent signed. CHARMAINE

## 2022-03-19 NOTE — ASSESSMENT & PLAN NOTE
Drinks 2-4 drinks qd but has cut back since increased debility from cervical spondylosis in the past month.   No signs of withdrawal.

## 2022-03-19 NOTE — ASSESSMENT & PLAN NOTE
Mr. Greenwood is a 56-year-old man with tobacco dependence and known lumbar spondylosis s/p DEBORAH (3/8/22) and workup concerning for cervical spondylosis with myelopathy and radiculopathy 2/2 cervical stenosis at C3-4 who presents for planned ACDF with NSGY. Hospital medicine consulted for pre-op clearance.     Consulted for pre-op clearance.   Afebrile. HR 60-70s. -150s/80s. Stating well on RA without tachypnea. Hg 12.9. MCV 89. Labs otherwise unremarkable.      Surgical Risk Assessment   Active cardiac issues:  Active decompensated heart failure? No   Unstable angina?  No   Significant uncontrolled arrhythmias? No   Severe valvular heart disease-Aortic or Mitral Stenosis? No   Recent MI or coronary revascularization < 30 days? No     Cardiac Risk Factors  History of CAD/ischemic heart disease? No   History of cerebrovascular disease?   No   History of congestive heart failure? No   Type 2 diabetes requiring insulin? No   Serum Creatinine > 2? No   Total cardiac risk factors 0   Add 1 point for high risk surgery (intraperitoneal, intrathoracic, or suprainguinal vascular)  Class I Risk (0 points): 3.9%  Class II Risk (1 point): 6.0%  Class III Risk (2 points): 10.1%  Class IV Risk (3+ points): 15.0%    Revised cardiac risk index is 3.9% for 30-day risk of death, MI, or cardiac arrest.     Other Issues:  1. Tobacco dependence, motivated towards cessation      Recommendation  1. Patient has the above yesica-operative risk at this time given the information currently available.   2. Baseline EKG prior to surgery.   3. Aside from above, no further workup needed prior to surgery.
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
## 2022-03-19 NOTE — ANESTHESIA PREPROCEDURE EVALUATION
[facility]  Anesthesia Pre-Operative Evaluation         Patient Name: Vicente Greenwood  YOB: 1965  MRN: [id number]    SUBJECTIVE:     Pre-operative evaluation for Procedure(s) (LRB):  C3-C4 Anterior Cervical Discectomy and Fusion (N/A)     03/19/2022    Vicente Greenwood is a 56 y.o. male w/ subacute cervical spondylytic myelopathy secondary to central stenosis at C3-C4. This is his first hospital admission per patient and does not remember ever needing anesthesia.     Patient now presents for the above procedure(s).      LDA:        Peripheral IV - Single Lumen 03/18/22 1829 20 G Left Forearm (Active)   Site Assessment Clean;Dry;Intact 03/19/22 0800   Extremity Assessment Distal to IV No abnormal discoloration 03/19/22 0800   Line Status Flushed;Saline locked 03/19/22 0800   Dressing Status Clean;Dry;Intact 03/19/22 0800   Dressing Intervention Integrity maintained 03/19/22 0800   Number of days: 1       Prev airway: None documented.    Drips:   [START ON 3/20/2022] sodium chloride 0.9%         Patient Active Problem List   Diagnosis    Synovial cyst of lumbar facet joint    Epidural lipomatosis    Lumbar radiculopathy    Spinal stenosis of lumbar region with neurogenic claudication    Lumbar spondylosis    DDD (degenerative disc disease), lumbar    Tobacco dependence    Cervical spondylosis with myelopathy and radiculopathy    Alcohol use    Preoperative clearance       Review of patient's allergies indicates:  No Known Allergies    Current Inpatient Medications:      No current facility-administered medications on file prior to encounter.     No current outpatient medications on file prior to encounter.       History reviewed. No pertinent surgical history.    Social History     Socioeconomic History    Marital status: Single   Tobacco Use    Smoking status: Current Every Day Smoker     Packs/day: 0.30     Types: Cigarettes    Smokeless tobacco: Never  Used   Substance and Sexual Activity    Alcohol use: Yes     Alcohol/week: 4.0 standard drinks     Types: 4 Cans of beer per week     Comment: PER DAY    Drug use: Never    Sexual activity: Not Currently       OBJECTIVE:     Vital Signs Range (Last 24H):  Temp:  [35.7 °C (96.3 °F)-36.8 °C (98.2 °F)]   Pulse:  [53-71]   Resp:  [15-18]   BP: (129-160)/(58-86)   SpO2:  [94 %-99 %]       Significant Labs:  Lab Results   Component Value Date    WBC 8.58 03/19/2022    HGB 12.9 (L) 03/19/2022    HCT 40.7 03/19/2022     03/19/2022    CHOL 200 (H) 02/03/2022    TRIG 71 02/03/2022    HDL 56 02/03/2022    ALT 32 03/18/2022    AST 21 03/18/2022     03/19/2022    K 3.8 03/19/2022     03/19/2022    CREATININE 0.7 03/19/2022    BUN 7 03/19/2022    CO2 25 03/19/2022    PSA 1.6 02/03/2022    INR 1.0 03/18/2022       Diagnostic Studies: No relevant studies.    EKG:   No results found for this or any previous visit.    2D ECHO:  TTE:  No results found for this or any previous visit.    ANA:  No results found for this or any previous visit.    ASSESSMENT/PLAN:           Pre-op Assessment    I have reviewed the Patient Summary Reports.     I have reviewed the Nursing Notes. I have reviewed the NPO Status.      Review of Systems  Anesthesia Hx:   Denies Personal Hx of Anesthesia complications.   Hematology/Oncology:  Hematology Normal   Oncology Normal     EENT/Dental:EENT/Dental Normal   Cardiovascular:  Cardiovascular Normal     Pulmonary:   Asthma    Musculoskeletal:   Arthritis   Spine Disorders: lumbar Degenerative disease    Neurological:   Neuromuscular Disease,   Peripheral Neuropathy    Endocrine:  Endocrine Normal        Physical Exam  General: Well nourished, Cooperative, Alert and Oriented    Airway:  Mallampati: II / II  Mouth Opening: Normal  TM Distance: Normal  Neck ROM: Normal ROM        Anesthesia Plan  Type of Anesthesia, risks & benefits discussed:    Anesthesia Type: Gen ETT  Intra-op Monitoring  Plan: Standard ASA Monitors  Post Op Pain Control Plan: multimodal analgesia and IV/PO Opioids PRN  Induction:  IV  Airway Plan: Direct and Video  Informed Consent: Informed consent signed with the Patient and all parties understand the risks and agree with anesthesia plan.  All questions answered.   ASA Score: 2  Anesthesia Plan Notes: Glidescope for airway with neck collar on  Spoke with neurosurgeon; do not need awake FOI for intubation  IV anesthesia requested due to neuro monitoring     Ready For Surgery From Anesthesia Perspective.     .

## 2022-03-19 NOTE — PROGRESS NOTES
[facility] - Neurosurgery ([facility])  Neurosurgery  Progress Note    Subjective:     History of Present Illness: 55 yo male with known history of lumbar spondylosis recently referred for DEBORAH on 3/8/22. Pt says following procedure, he had full resolution of related radicular symptoms but acutely developed signficant gait imbalance requiring a walker to ambulate. Pt does not complain subjectively of focal weakness, and says that since event symptoms have not worsened but intermittently improved and relapsed to current baseline. Pt was seen and assessed in clinic today and referred to ED for evaluation. No bowel or bladder symptoms. Pt has no other medical history and take no home medications.         Post-Op Info:  Procedure(s) (LRB):  C3-C4 Anterior Cervical Discectomy and Fusion (N/A)         Interval History: Admitted overnight to Neurosurgery service.     Medications:  Continuous Infusions:  Scheduled Meds:  PRN Meds:dextrose 10%, dextrose 10%, glucagon (human recombinant), glucose, glucose, glucose, hydrALAZINE, labetaloL     Review of Systems  Objective:     Weight: 81.6 kg (180 lb)  Body mass index is 30.9 kg/m².  Vital Signs (Most Recent):  Temp: 98.2 °F (36.8 °C) (03/19/22 1149)  Pulse: 64 (03/19/22 1149)  Resp: 15 (03/19/22 1149)  BP: (!) 155/85 (03/19/22 1149)  SpO2: (!) 94 % (03/19/22 1149)   Vital Signs (24h Range):  Temp:  [96.3 °F (35.7 °C)-99.5 °F (37.5 °C)] 98.2 °F (36.8 °C)  Pulse:  [] 64  Resp:  [15-18] 15  SpO2:  [94 %-99 %] 94 %  BP: (125-160)/(58-86) 155/85                          Physical Exam  General: well developed, well nourished, no distress.   Head: normocephalic, atraumatic  Cervical Spine: No midline tenderness to palpation.  Thoracolumbosacral Spine: No midline tenderness to palpation.  GCS: Motor: 6/Verbal: 5/Eyes: 4 GCS Total: 15  Mental Status: Awake, Alert, Oriented x 4  Language: No aphasia  Speech: No dysarthria  Facial Droop: None   Cranial nerves: CN III-XII grossly  intact.  Visual Fields: Intact.   Eyes: Pupils equal and reactive to light. Intact Conjugate horizontal and vertical pursuit. No nystagmus. No gaze deviation.   Pulmonary: No distress.  Sensory: No deficit.  Propioception: No deficit in 1st digit of toe bilaterally.  Rectal Tone: Not tested.  Drift: None.  Upper Extremity Ataxia: No Dysmetria Bilaterally  Lower Extremity Ataxia: Not tested.  Dysdiadochokinesia: Not tested.  Reflexes: 2+ patellar bilaterally.  Medel: Absent  Clonus: Absent  Babinski: Absent  Romberg: Positive.  Pulses: Brisk and symmetric radial, DP and tibial pulses.  Motor Strength:     Strength   Shoulder Abduction Elbow Extension Elbow Flexion Wrist Extension Wrist Flexion Finger Opposition Finger Add Finger Abd   Upper: R 5/5 5/5 5/5 5/5 5/5 5/5 5/5 5/5     L 5/5 5/5 5/5 4-/5 5/5 4/5 5/5 5/5       Hip Flexion Knee Extension Knee  Flexion Ankle Dflexion Ankle Pflexion EHL       Lower: R 5/5 5/5 5/5 5/5 5/5 5/5         L 5/5 5/5 5/5 5/5 5/5 5/5               Neurosurgery Physical Exam    Significant Labs:  Recent Labs   Lab 03/18/22 2006 03/19/22  0801   * 91    137   K 4.2 3.8    104   CO2 23 25   BUN 10 7   CREATININE 0.8 0.7   CALCIUM 9.7 9.1     Recent Labs   Lab 03/18/22 2006 03/19/22  0801   WBC 9.96 8.58   HGB 13.6* 12.9*   HCT 41.6 40.7    344     Recent Labs   Lab 03/18/22 2006   INR 1.0   APTT 27.9     Microbiology Results (last 7 days)       ** No results found for the last 168 hours. **          All pertinent labs from the last 24 hours have been reviewed.    Significant Diagnostics:  I have reviewed all pertinent imaging results/findings within the past 24 hours.    Assessment/Plan:     Cervical spondylosis with myelopathy and radiculopathy  57 yo male with pmh of lumbar spondylosis s/p DEBORAH (3/8/22) since with workup concerning for subacute cervical spondylytic myelopathy secondary to central stenosis at C3-C4. Neurologically stable on exam.      --Admit  to neurosurgery under floor status.   Begin q4 neurochecks.  --Plan for C3-4 ACDF tomorrow AM; booked for surgery   Pending Hospital Medicine pre-op clearance. CXR, EKG obtained.   --All imaging reviewed.    MRI with C3-4 severe stenosis/signal change from herniated disc, multilevel subaxial cervical spine stenosis   CT C Spine without ossification at C3-4 and minimal non-continuous ossification of PLL.    Flexion extension Xray without dynamic instability.   --Continue rigid cervical othrosis when out of bed.   --We will continue to monitor closely, please contact us with any questions or concerns.           Greg Harding MD  Neurosurgery  [facility] - Neurosurgery ([facility])

## 2022-03-19 NOTE — ASSESSMENT & PLAN NOTE
57 yo male with pmh of lumbar spondylosis s/p DEBORAH (3/8/22) since with workup concerning for subacute cervical spondylytic myelopathy secondary to central stenosis at C3-C4. Neurologically stable on exam.      --Admit to neurosurgery under floor status.   Begin q4 neurochecks.  --Plan for C3-4 ACDF tomorrow AM; booked for surgery   Pending Hospital Medicine pre-op clearance. CXR, EKG obtained.   --All imaging reviewed.    MRI with C3-4 severe stenosis/signal change from herniated disc, multilevel subaxial cervical spine stenosis   CT C Spine without ossification at C3-4 and minimal non-continuous ossification of PLL.    Flexion extension Xray without dynamic instability.   --Continue rigid cervical othrosis when out of bed.   --We will continue to monitor closely, please contact us with any questions or concerns.

## 2022-03-19 NOTE — ASSESSMENT & PLAN NOTE
Smokes 1/2 ppd, has cut back significantly in the past 2 months due to debility from cervical spondylosis.   Motivated to quit.     - engaged in 3 minute tobacco cessation conversation   - motivated to quit  - does not need nicotine replacement, declines smoking cessation referral
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
## 2022-03-19 NOTE — CONSULTS
Consult Note  Neurosurgery    Admit Date: 3/18/2022  LOS: 0    Code Status: Full Code     CC: <principal problem not specified>    SUBJECTIVE:     History of Present Illness: 55 yo male with known history of lumbar spondylosis recently referred for DEBORAH on 3/8/22. Pt says following procedure, he had full resolution of related radicular symptoms but acutely developed signficant gait imbalance requiring a walker to ambulate. Pt does not complain subjectively of focal weakness, and says that since event symptoms have not worsened but intermittently improved and relapsed to current baseline. Pt was seen and assessed in clinic today and referred to ED for evaluation. No bowel or bladder symptoms. Pt has no other medical history and take no home medications.     On exam pt is in a rigid collar. He has weakness in left wrist extension and 1st and 5th digit opposition and significant upright seated and standing gait imbalance. No cerebellar signs. No lee. No hyperreflexia. Pt is otherwise without deficit.     MRI cervical spine demonstrates nuclear herniation C3-C4 with thecal sac deformation and focal signal change. There remains patent subarachnoid space circumferentially about the cord.    GCS 15.         OBJECTIVE:   Vital Signs (Most Recent):   Temp: 98.3 °F (36.8 °C) (03/18/22 1853)  Pulse: 71 (03/18/22 1853)  Resp: 18 (03/18/22 1853)  BP: 125/71 (03/18/22 1853)  SpO2: 96 % (03/18/22 1853)    Vital Signs (24h Range):   Temp:  [98.3 °F (36.8 °C)-99.5 °F (37.5 °C)] 98.3 °F (36.8 °C)  Pulse:  [] 71  Resp:  [17-18] 18  SpO2:  [93 %-97 %] 96 %  BP: (125-161)/(71-81) 125/71      I & O (Last 24h):  No intake or output data in the 24 hours ending 03/18/22 2113    Physical Exam:  General: well developed, well nourished, no distress.   Head: normocephalic, atraumatic  Cervical Spine: No midline tenderness to palpation.  Thoracolumbosacral Spine: No midline tenderness to palpation.  GCS: Motor: 6/Verbal: 5/Eyes: 4 GCS  Total: 15  Mental Status: Awake, Alert, Oriented x 4  Language: No aphasia  Speech: No dysarthria  Facial Droop: None   Cranial nerves: CN III-XII grossly intact.  Visual Fields: Intact.   Eyes: Pupils equal and reactive to light. Intact Conjugate horizontal and vertical pursuit. No nystagmus. No gaze deviation.   Pulmonary: No distress.  Sensory: No deficit.  Propioception: No deficit in 1st digit of toe bilaterally.  Rectal Tone: Not tested.  Drift: None.  Upper Extremity Ataxia: No Dysmetria Bilaterally  Lower Extremity Ataxia: Not tested.  Dysdiadochokinesia: Not tested.  Reflexes: 2+ patellar bilaterally.  Medel: Absent  Clonus: Absent  Babinski: Absent  Romberg: Positive.  Pulses: Brisk and symmetric radial, DP and tibial pulses.  Motor Strength:    Strength  Shoulder Abduction Elbow Extension Elbow Flexion Wrist Extension Wrist Flexion Finger Opposition Finger Add Finger Abd   Upper: R 5/5 5/5 5/5 5/5 5/5 5/5 5/5 5/5    L 5/5 5/5 5/5 4-/5 5/5 4/5 5/5 5/5     Hip Flexion Knee Extension Knee  Flexion Ankle Dflexion Ankle Pflexion EHL     Lower: R 5/5 5/5 5/5 5/5 5/5 5/5      L 5/5 5/5 5/5 5/5 5/5 5/5               Lines/Drains/Airway:          Nutrition/Tube Feeds:   Current Diet Order   Procedures    Diet Adult Regular (IDDSI Level 7)    Diet NPO       Labs:  ABG: No results for input(s): PH, PO2, PCO2, HCO3, POCSATURATED, BE in the last 24 hours.  BMP:  Recent Labs   Lab 03/18/22 2006      K 4.2      CO2 23   BUN 10   CREATININE 0.8   *     LFT:   Lab Results   Component Value Date    AST 21 03/18/2022    ALT 32 03/18/2022    ALKPHOS 54 (L) 03/18/2022    BILITOT 0.5 03/18/2022    ALBUMIN 4.0 03/18/2022    PROT 7.3 03/18/2022     CBC:   Lab Results   Component Value Date    WBC 9.96 03/18/2022    HGB 13.6 (L) 03/18/2022    HCT 41.6 03/18/2022    MCV 87 03/18/2022     03/18/2022     Microbiology x 7d:   Microbiology Results (last 7 days)     ** No results found for the last 168  hours. **            ASSESSMENT/PLAN:   57 yo male with pmh of lumbar spondylosis s/p DEBORAH (3/8/22) since with workup concerning for subacute cervical spondylytic myelopathy secondary to central stenosis at C3-C4. Neurologically stable on exam.     --No acute neurosurgical intervention required.  --Admit to neurosurgery under floor status.  --Begin q4 neurochecks.  --Begin q4 vital checks.  --Please keep pt NPO at midnight.  --Will obtain CT cervical spine without contrast.  --Will obtain flexion-extension plain films cervical spine.  --Continue rigid cervical othrosis.  --May remove collar for dynamic imaging.  --Will obtain baseline EKG and CXR given lack of prior medical history.  --Will further discuss potential management options with patient in morning.  --Discussed above with pt and emergency medical teams.  --We will continue to monitor closely, please contact us with any questions or concerns.    Garfield Little

## 2022-03-19 NOTE — SUBJECTIVE & OBJECTIVE
Interval History: Admitted overnight to Neurosurgery service.     Medications:  Continuous Infusions:  Scheduled Meds:  PRN Meds:dextrose 10%, dextrose 10%, glucagon (human recombinant), glucose, glucose, glucose, hydrALAZINE, labetaloL     Review of Systems  Objective:     Weight: 81.6 kg (180 lb)  Body mass index is 30.9 kg/m².  Vital Signs (Most Recent):  Temp: 98.2 °F (36.8 °C) (03/19/22 1149)  Pulse: 64 (03/19/22 1149)  Resp: 15 (03/19/22 1149)  BP: (!) 155/85 (03/19/22 1149)  SpO2: (!) 94 % (03/19/22 1149)   Vital Signs (24h Range):  Temp:  [96.3 °F (35.7 °C)-99.5 °F (37.5 °C)] 98.2 °F (36.8 °C)  Pulse:  [] 64  Resp:  [15-18] 15  SpO2:  [94 %-99 %] 94 %  BP: (125-160)/(58-86) 155/85                          Physical Exam  General: well developed, well nourished, no distress.   Head: normocephalic, atraumatic  Cervical Spine: No midline tenderness to palpation.  Thoracolumbosacral Spine: No midline tenderness to palpation.  GCS: Motor: 6/Verbal: 5/Eyes: 4 GCS Total: 15  Mental Status: Awake, Alert, Oriented x 4  Language: No aphasia  Speech: No dysarthria  Facial Droop: None   Cranial nerves: CN III-XII grossly intact.  Visual Fields: Intact.   Eyes: Pupils equal and reactive to light. Intact Conjugate horizontal and vertical pursuit. No nystagmus. No gaze deviation.   Pulmonary: No distress.  Sensory: No deficit.  Propioception: No deficit in 1st digit of toe bilaterally.  Rectal Tone: Not tested.  Drift: None.  Upper Extremity Ataxia: No Dysmetria Bilaterally  Lower Extremity Ataxia: Not tested.  Dysdiadochokinesia: Not tested.  Reflexes: 2+ patellar bilaterally.  Medel: Absent  Clonus: Absent  Babinski: Absent  Romberg: Positive.  Pulses: Brisk and symmetric radial, DP and tibial pulses.  Motor Strength:     Strength   Shoulder Abduction Elbow Extension Elbow Flexion Wrist Extension Wrist Flexion Finger Opposition Finger Add Finger Abd   Upper: R 5/5 5/5 5/5 5/5 5/5 5/5 5/5 5/5     L 5/5 5/5 5/5 4-/5  5/5 4/5 5/5 5/5       Hip Flexion Knee Extension Knee  Flexion Ankle Dflexion Ankle Pflexion EHL       Lower: R 5/5 5/5 5/5 5/5 5/5 5/5         L 5/5 5/5 5/5 5/5 5/5 5/5               Neurosurgery Physical Exam    Significant Labs:  Recent Labs   Lab 03/18/22 2006 03/19/22  0801   * 91    137   K 4.2 3.8    104   CO2 23 25   BUN 10 7   CREATININE 0.8 0.7   CALCIUM 9.7 9.1     Recent Labs   Lab 03/18/22 2006 03/19/22  0801   WBC 9.96 8.58   HGB 13.6* 12.9*   HCT 41.6 40.7    344     Recent Labs   Lab 03/18/22 2006   INR 1.0   APTT 27.9     Microbiology Results (last 7 days)       ** No results found for the last 168 hours. **          All pertinent labs from the last 24 hours have been reviewed.    Significant Diagnostics:  I have reviewed all pertinent imaging results/findings within the past 24 hours.

## 2022-03-19 NOTE — NURSING
Patient admitted AOx4, ambulatory with walker at [facility], c-collar on, reports no pain at this time. O2 sats 99% on RA, PIV SL, Regular diet ordered, pt top be NPO at midnight, continent x2, skin intact as witnessed by OC this shift. Will continue to monitor.

## 2022-03-19 NOTE — H&P
History and Physical  Neurosurgery    Admit Date: 3/18/2022  LOS: 0    Code Status: Full Code     CC: <principal problem not specified>    SUBJECTIVE:     History of Present Illness: 55 yo male with known history of lumbar spondylosis recently referred for DEBORAH on 3/8/22. Pt says following procedure, he had full resolution of related radicular symptoms but acutely developed signficant gait imbalance requiring a walker to ambulate. Pt does not complain subjectively of focal weakness, and says that since event symptoms have not worsened but intermittently improved and relapsed to current baseline. Pt was seen and assessed in clinic today and referred to ED for evaluation. No bowel or bladder symptoms. Pt has no other medical history and take no home medications.     On exam pt is in a rigid collar. He has weakness in left wrist extension and 1st and 5th digit opposition and significant upright seated and standing gait imbalance. No cerebellar signs. No lee. No hyperreflexia. Pt is otherwise without deficit.     MRI cervical spine demonstrates nuclear herniation C3-C4 with thecal sac deformation and focal signal change. There remains patent subarachnoid space circumferentially about the cord.    GCS 15.         OBJECTIVE:   Vital Signs (Most Recent):   Temp: 98.3 °F (36.8 °C) (03/18/22 1853)  Pulse: 71 (03/18/22 1853)  Resp: 18 (03/18/22 1853)  BP: 125/71 (03/18/22 1853)  SpO2: 96 % (03/18/22 1853)    Vital Signs (24h Range):   Temp:  [98.3 °F (36.8 °C)-99.5 °F (37.5 °C)] 98.3 °F (36.8 °C)  Pulse:  [] 71  Resp:  [17-18] 18  SpO2:  [93 %-97 %] 96 %  BP: (125-161)/(71-81) 125/71      I & O (Last 24h):  No intake or output data in the 24 hours ending 03/18/22 2124    Physical Exam:  General: well developed, well nourished, no distress.   Head: normocephalic, atraumatic  Cervical Spine: No midline tenderness to palpation.  Thoracolumbosacral Spine: No midline tenderness to palpation.  GCS: Motor: 6/Verbal: 5/Eyes:  4 GCS Total: 15  Mental Status: Awake, Alert, Oriented x 4  Language: No aphasia  Speech: No dysarthria  Facial Droop: None   Cranial nerves: CN III-XII grossly intact.  Visual Fields: Intact.   Eyes: Pupils equal and reactive to light. Intact Conjugate horizontal and vertical pursuit. No nystagmus. No gaze deviation.   Pulmonary: No distress.  Sensory: No deficit.  Propioception: No deficit in 1st digit of toe bilaterally.  Rectal Tone: Not tested.  Drift: None.  Upper Extremity Ataxia: No Dysmetria Bilaterally  Lower Extremity Ataxia: Not tested.  Dysdiadochokinesia: Not tested.  Reflexes: 2+ patellar bilaterally.  Medel: Absent  Clonus: Absent  Babinski: Absent  Romberg: Positive.  Pulses: Brisk and symmetric radial, DP and tibial pulses.  Motor Strength:    Strength  Shoulder Abduction Elbow Extension Elbow Flexion Wrist Extension Wrist Flexion Finger Opposition Finger Add Finger Abd   Upper: R 5/5 5/5 5/5 5/5 5/5 5/5 5/5 5/5    L 5/5 5/5 5/5 4-/5 5/5 4/5 5/5 5/5     Hip Flexion Knee Extension Knee  Flexion Ankle Dflexion Ankle Pflexion EHL     Lower: R 5/5 5/5 5/5 5/5 5/5 5/5      L 5/5 5/5 5/5 5/5 5/5 5/5               Lines/Drains/Airway:          Nutrition/Tube Feeds:   Current Diet Order   Procedures    Diet Adult Regular (IDDSI Level 7)    Diet NPO       Labs:  ABG: No results for input(s): PH, PO2, PCO2, HCO3, POCSATURATED, BE in the last 24 hours.  BMP:  Recent Labs   Lab 03/18/22 2006      K 4.2      CO2 23   BUN 10   CREATININE 0.8   *     LFT:   Lab Results   Component Value Date    AST 21 03/18/2022    ALT 32 03/18/2022    ALKPHOS 54 (L) 03/18/2022    BILITOT 0.5 03/18/2022    ALBUMIN 4.0 03/18/2022    PROT 7.3 03/18/2022     CBC:   Lab Results   Component Value Date    WBC 9.96 03/18/2022    HGB 13.6 (L) 03/18/2022    HCT 41.6 03/18/2022    MCV 87 03/18/2022     03/18/2022     Microbiology x 7d:   Microbiology Results (last 7 days)     ** No results found for the last  168 hours. **            ASSESSMENT/PLAN:   55 yo male with pmh of lumbar spondylosis s/p DEBORAH (3/8/22) since with workup concerning for subacute cervical spondylytic myelopathy secondary to central stenosis at C3-C4. Neurologically stable on exam.     --No acute neurosurgical intervention required.  --Admit to neurosurgery under floor status.  --Begin q4 neurochecks.  --Begin q4 vital checks.  --Please keep pt NPO at midnight.  --Will obtain CT cervical spine without contrast.  --Will obtain flexion-extension plain films cervical spine.  --Continue rigid cervical othrosis.  --May remove collar for dynamic imaging.  --Will obtain baseline EKG and CXR given lack of prior medical history.  --Will further discuss potential management options with patient in morning.  --Discussed above with pt and emergency medical teams.  --We will continue to monitor closely, please contact us with any questions or concerns.    Garfield Little
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
## 2022-03-20 ENCOUNTER — ANESTHESIA (OUTPATIENT)
Dept: SURGERY | Facility: HOSPITAL | Age: 57
DRG: 472 | End: 2022-03-20
Payer: COMMERCIAL

## 2022-03-20 LAB
ANION GAP SERPL CALC-SCNC: 8 MMOL/L (ref 8–16)
APTT BLDCRRT: 28.5 SEC (ref 21–32)
BASOPHILS # BLD AUTO: 0.06 K/UL (ref 0–0.2)
BASOPHILS NFR BLD: 0.6 % (ref 0–1.9)
BUN SERPL-MCNC: 8 MG/DL (ref 6–20)
CALCIUM SERPL-MCNC: 9.2 MG/DL (ref 8.7–10.5)
CHLORIDE SERPL-SCNC: 105 MMOL/L (ref 95–110)
CO2 SERPL-SCNC: 25 MMOL/L (ref 23–29)
CREAT SERPL-MCNC: 0.6 MG/DL (ref 0.5–1.4)
DIFFERENTIAL METHOD: ABNORMAL
EOSINOPHIL # BLD AUTO: 0.2 K/UL (ref 0–0.5)
EOSINOPHIL NFR BLD: 1.7 % (ref 0–8)
ERYTHROCYTE [DISTWIDTH] IN BLOOD BY AUTOMATED COUNT: 13.3 % (ref 11.5–14.5)
EST. GFR  (AFRICAN AMERICAN): >60 ML/MIN/1.73 M^2
EST. GFR  (NON AFRICAN AMERICAN): >60 ML/MIN/1.73 M^2
GLUCOSE SERPL-MCNC: 95 MG/DL (ref 70–110)
HCT VFR BLD AUTO: 40.8 % (ref 40–54)
HGB BLD-MCNC: 13.3 G/DL (ref 14–18)
IMM GRANULOCYTES # BLD AUTO: 0.02 K/UL (ref 0–0.04)
IMM GRANULOCYTES NFR BLD AUTO: 0.2 % (ref 0–0.5)
INR PPP: 1.1 (ref 0.8–1.2)
LYMPHOCYTES # BLD AUTO: 4.5 K/UL (ref 1–4.8)
LYMPHOCYTES NFR BLD: 44.9 % (ref 18–48)
MCH RBC QN AUTO: 28.5 PG (ref 27–31)
MCHC RBC AUTO-ENTMCNC: 32.6 G/DL (ref 32–36)
MCV RBC AUTO: 88 FL (ref 82–98)
MONOCYTES # BLD AUTO: 0.8 K/UL (ref 0.3–1)
MONOCYTES NFR BLD: 8.3 % (ref 4–15)
NEUTROPHILS # BLD AUTO: 4.4 K/UL (ref 1.8–7.7)
NEUTROPHILS NFR BLD: 44.3 % (ref 38–73)
NRBC BLD-RTO: 0 /100 WBC
PLATELET # BLD AUTO: 344 K/UL (ref 150–450)
PMV BLD AUTO: 8.9 FL (ref 9.2–12.9)
POTASSIUM SERPL-SCNC: 3.9 MMOL/L (ref 3.5–5.1)
PROTHROMBIN TIME: 11 SEC (ref 9–12.5)
RBC # BLD AUTO: 4.66 M/UL (ref 4.6–6.2)
SODIUM SERPL-SCNC: 138 MMOL/L (ref 136–145)
WBC # BLD AUTO: 9.97 K/UL (ref 3.9–12.7)

## 2022-03-20 PROCEDURE — 25000003 PHARM REV CODE 250: Performed by: NURSE ANESTHETIST, CERTIFIED REGISTERED

## 2022-03-20 PROCEDURE — 85025 COMPLETE CBC W/AUTO DIFF WBC: CPT | Performed by: STUDENT IN AN ORGANIZED HEALTH CARE EDUCATION/TRAINING PROGRAM

## 2022-03-20 PROCEDURE — 99024 POSTOP FOLLOW-UP VISIT: CPT | Mod: ,,, | Performed by: NEUROLOGICAL SURGERY

## 2022-03-20 PROCEDURE — 63600175 PHARM REV CODE 636 W HCPCS: Performed by: NEUROLOGICAL SURGERY

## 2022-03-20 PROCEDURE — 85610 PROTHROMBIN TIME: CPT | Performed by: STUDENT IN AN ORGANIZED HEALTH CARE EDUCATION/TRAINING PROGRAM

## 2022-03-20 PROCEDURE — 27800903 OPTIME MED/SURG SUP & DEVICES OTHER IMPLANTS: Performed by: NEUROLOGICAL SURGERY

## 2022-03-20 PROCEDURE — 36415 COLL VENOUS BLD VENIPUNCTURE: CPT | Performed by: STUDENT IN AN ORGANIZED HEALTH CARE EDUCATION/TRAINING PROGRAM

## 2022-03-20 PROCEDURE — 99024 PR POST-OP FOLLOW-UP VISIT: ICD-10-PCS | Mod: ,,, | Performed by: NEUROLOGICAL SURGERY

## 2022-03-20 PROCEDURE — 63600175 PHARM REV CODE 636 W HCPCS: Performed by: STUDENT IN AN ORGANIZED HEALTH CARE EDUCATION/TRAINING PROGRAM

## 2022-03-20 PROCEDURE — 71000033 HC RECOVERY, INTIAL HOUR: Performed by: NEUROLOGICAL SURGERY

## 2022-03-20 PROCEDURE — 25000003 PHARM REV CODE 250: Performed by: NEUROLOGICAL SURGERY

## 2022-03-20 PROCEDURE — 71000016 HC POSTOP RECOV ADDL HR: Performed by: NEUROLOGICAL SURGERY

## 2022-03-20 PROCEDURE — C1769 GUIDE WIRE: HCPCS | Performed by: NEUROLOGICAL SURGERY

## 2022-03-20 PROCEDURE — 25000003 PHARM REV CODE 250: Performed by: STUDENT IN AN ORGANIZED HEALTH CARE EDUCATION/TRAINING PROGRAM

## 2022-03-20 PROCEDURE — 94761 N-INVAS EAR/PLS OXIMETRY MLT: CPT

## 2022-03-20 PROCEDURE — D9220A PRA ANESTHESIA: ICD-10-PCS | Mod: CRNA,,, | Performed by: NURSE ANESTHETIST, CERTIFIED REGISTERED

## 2022-03-20 PROCEDURE — 20930 SP BONE ALGRFT MORSEL ADD-ON: CPT | Mod: ,,, | Performed by: NEUROLOGICAL SURGERY

## 2022-03-20 PROCEDURE — 27201423 OPTIME MED/SURG SUP & DEVICES STERILE SUPPLY: Performed by: NEUROLOGICAL SURGERY

## 2022-03-20 PROCEDURE — 71000015 HC POSTOP RECOV 1ST HR: Performed by: NEUROLOGICAL SURGERY

## 2022-03-20 PROCEDURE — 20930 PR ALLOGRAFT FOR SPINE SURGERY ONLY MORSELIZED: ICD-10-PCS | Mod: ,,, | Performed by: NEUROLOGICAL SURGERY

## 2022-03-20 PROCEDURE — 22853 INSJ BIOMECHANICAL DEVICE: CPT | Mod: ,,, | Performed by: NEUROLOGICAL SURGERY

## 2022-03-20 PROCEDURE — D9220A PRA ANESTHESIA: ICD-10-PCS | Mod: ANES,,, | Performed by: ANESTHESIOLOGY

## 2022-03-20 PROCEDURE — 22551 PR ARTHRODESIS ANT INTERBODY INC DISCECTOMY, CERVICAL BELOW C2: ICD-10-PCS | Mod: ,,, | Performed by: NEUROLOGICAL SURGERY

## 2022-03-20 PROCEDURE — 22853 PR INSERT BIOMECH DEV W/INTERBODY ARTHRODESIS, EA CONTIGUOUS DEFECT: ICD-10-PCS | Mod: ,,, | Performed by: NEUROLOGICAL SURGERY

## 2022-03-20 PROCEDURE — D9220A PRA ANESTHESIA: Mod: ANES,,, | Performed by: ANESTHESIOLOGY

## 2022-03-20 PROCEDURE — 36000710: Performed by: NEUROLOGICAL SURGERY

## 2022-03-20 PROCEDURE — 11000001 HC ACUTE MED/SURG PRIVATE ROOM

## 2022-03-20 PROCEDURE — 36000711: Performed by: NEUROLOGICAL SURGERY

## 2022-03-20 PROCEDURE — C1713 ANCHOR/SCREW BN/BN,TIS/BN: HCPCS | Performed by: NEUROLOGICAL SURGERY

## 2022-03-20 PROCEDURE — 37000008 HC ANESTHESIA 1ST 15 MINUTES: Performed by: NEUROLOGICAL SURGERY

## 2022-03-20 PROCEDURE — D9220A PRA ANESTHESIA: Mod: CRNA,,, | Performed by: NURSE ANESTHETIST, CERTIFIED REGISTERED

## 2022-03-20 PROCEDURE — 85730 THROMBOPLASTIN TIME PARTIAL: CPT | Performed by: STUDENT IN AN ORGANIZED HEALTH CARE EDUCATION/TRAINING PROGRAM

## 2022-03-20 PROCEDURE — 63600175 PHARM REV CODE 636 W HCPCS: Performed by: NURSE ANESTHETIST, CERTIFIED REGISTERED

## 2022-03-20 PROCEDURE — 37000009 HC ANESTHESIA EA ADD 15 MINS: Performed by: NEUROLOGICAL SURGERY

## 2022-03-20 PROCEDURE — 80048 BASIC METABOLIC PNL TOTAL CA: CPT | Performed by: STUDENT IN AN ORGANIZED HEALTH CARE EDUCATION/TRAINING PROGRAM

## 2022-03-20 PROCEDURE — 22551 ARTHRD ANT NTRBDY CERVICAL: CPT | Mod: ,,, | Performed by: NEUROLOGICAL SURGERY

## 2022-03-20 DEVICE — PUTTY OSTEOSELECT IN SYR 1CC: Type: IMPLANTABLE DEVICE | Site: SPINE CERVICAL | Status: FUNCTIONAL

## 2022-03-20 RX ORDER — LIDOCAINE HYDROCHLORIDE AND EPINEPHRINE 10; 10 MG/ML; UG/ML
INJECTION, SOLUTION INFILTRATION; PERINEURAL
Status: DISCONTINUED | OUTPATIENT
Start: 2022-03-20 | End: 2022-03-20 | Stop reason: HOSPADM

## 2022-03-20 RX ORDER — ROCURONIUM BROMIDE 10 MG/ML
INJECTION, SOLUTION INTRAVENOUS
Status: DISCONTINUED | OUTPATIENT
Start: 2022-03-20 | End: 2022-03-20

## 2022-03-20 RX ORDER — KETAMINE HCL IN 0.9 % NACL 50 MG/5 ML
SYRINGE (ML) INTRAVENOUS
Status: DISCONTINUED | OUTPATIENT
Start: 2022-03-20 | End: 2022-03-20

## 2022-03-20 RX ORDER — MIDAZOLAM HYDROCHLORIDE 1 MG/ML
INJECTION INTRAMUSCULAR; INTRAVENOUS
Status: DISCONTINUED | OUTPATIENT
Start: 2022-03-20 | End: 2022-03-20

## 2022-03-20 RX ORDER — ACETAMINOPHEN 10 MG/ML
INJECTION, SOLUTION INTRAVENOUS
Status: DISCONTINUED | OUTPATIENT
Start: 2022-03-20 | End: 2022-03-20

## 2022-03-20 RX ORDER — OXYCODONE HYDROCHLORIDE 5 MG/1
5 TABLET ORAL EVERY 4 HOURS PRN
Status: DISCONTINUED | OUTPATIENT
Start: 2022-03-20 | End: 2022-03-23 | Stop reason: HOSPADM

## 2022-03-20 RX ORDER — PROPOFOL 10 MG/ML
VIAL (ML) INTRAVENOUS CONTINUOUS PRN
Status: DISCONTINUED | OUTPATIENT
Start: 2022-03-20 | End: 2022-03-20

## 2022-03-20 RX ORDER — REMIFENTANIL HYDROCHLORIDE 1 MG/ML
INJECTION, POWDER, LYOPHILIZED, FOR SOLUTION INTRAVENOUS
Status: DISCONTINUED | OUTPATIENT
Start: 2022-03-20 | End: 2022-03-20

## 2022-03-20 RX ORDER — REMIFENTANIL HYDROCHLORIDE 1 MG/ML
INJECTION, POWDER, LYOPHILIZED, FOR SOLUTION INTRAVENOUS CONTINUOUS PRN
Status: DISCONTINUED | OUTPATIENT
Start: 2022-03-20 | End: 2022-03-20

## 2022-03-20 RX ORDER — EPHEDRINE SULFATE 50 MG/ML
INJECTION, SOLUTION INTRAVENOUS
Status: DISCONTINUED | OUTPATIENT
Start: 2022-03-20 | End: 2022-03-20

## 2022-03-20 RX ORDER — FENTANYL CITRATE 50 UG/ML
25 INJECTION, SOLUTION INTRAMUSCULAR; INTRAVENOUS EVERY 5 MIN PRN
Status: DISCONTINUED | OUTPATIENT
Start: 2022-03-20 | End: 2022-03-20 | Stop reason: HOSPADM

## 2022-03-20 RX ORDER — PHENYLEPHRINE HYDROCHLORIDE 10 MG/ML
INJECTION INTRAVENOUS
Status: DISCONTINUED | OUTPATIENT
Start: 2022-03-20 | End: 2022-03-20

## 2022-03-20 RX ORDER — LABETALOL HCL 20 MG/4 ML
20 SYRINGE (ML) INTRAVENOUS EVERY 4 HOURS PRN
Status: DISCONTINUED | OUTPATIENT
Start: 2022-03-20 | End: 2022-03-21

## 2022-03-20 RX ORDER — LIDOCAINE HCL/PF 100 MG/5ML
SYRINGE (ML) INTRAVENOUS
Status: DISCONTINUED | OUTPATIENT
Start: 2022-03-20 | End: 2022-03-20

## 2022-03-20 RX ORDER — CEFTRIAXONE 1 G/1
INJECTION, POWDER, FOR SOLUTION INTRAMUSCULAR; INTRAVENOUS
Status: DISCONTINUED | OUTPATIENT
Start: 2022-03-20 | End: 2022-03-20 | Stop reason: HOSPADM

## 2022-03-20 RX ORDER — OXYCODONE HYDROCHLORIDE 5 MG/1
5 TABLET ORAL
Status: DISCONTINUED | OUTPATIENT
Start: 2022-03-20 | End: 2022-03-20 | Stop reason: HOSPADM

## 2022-03-20 RX ORDER — PROPOFOL 10 MG/ML
VIAL (ML) INTRAVENOUS
Status: DISCONTINUED | OUTPATIENT
Start: 2022-03-20 | End: 2022-03-20

## 2022-03-20 RX ORDER — DEXMEDETOMIDINE HYDROCHLORIDE 100 UG/ML
INJECTION, SOLUTION INTRAVENOUS
Status: DISCONTINUED | OUTPATIENT
Start: 2022-03-20 | End: 2022-03-20

## 2022-03-20 RX ORDER — HYDROMORPHONE HYDROCHLORIDE 1 MG/ML
1 INJECTION, SOLUTION INTRAMUSCULAR; INTRAVENOUS; SUBCUTANEOUS EVERY 4 HOURS PRN
Status: DISCONTINUED | OUTPATIENT
Start: 2022-03-20 | End: 2022-03-21

## 2022-03-20 RX ORDER — METHYLPREDNISOLONE ACETATE 40 MG/ML
INJECTION, SUSPENSION INTRA-ARTICULAR; INTRALESIONAL; INTRAMUSCULAR; SOFT TISSUE
Status: DISCONTINUED | OUTPATIENT
Start: 2022-03-20 | End: 2022-03-20 | Stop reason: HOSPADM

## 2022-03-20 RX ORDER — SUCCINYLCHOLINE CHLORIDE 20 MG/ML
INJECTION INTRAMUSCULAR; INTRAVENOUS
Status: DISCONTINUED | OUTPATIENT
Start: 2022-03-20 | End: 2022-03-20

## 2022-03-20 RX ORDER — CEFAZOLIN SODIUM 1 G/3ML
INJECTION, POWDER, FOR SOLUTION INTRAMUSCULAR; INTRAVENOUS
Status: DISCONTINUED | OUTPATIENT
Start: 2022-03-20 | End: 2022-03-20

## 2022-03-20 RX ORDER — ACETAMINOPHEN 325 MG/1
650 TABLET ORAL EVERY 6 HOURS PRN
Status: DISCONTINUED | OUTPATIENT
Start: 2022-03-20 | End: 2022-03-23 | Stop reason: HOSPADM

## 2022-03-20 RX ORDER — ONDANSETRON 2 MG/ML
INJECTION INTRAMUSCULAR; INTRAVENOUS
Status: DISCONTINUED | OUTPATIENT
Start: 2022-03-20 | End: 2022-03-20

## 2022-03-20 RX ORDER — DEXAMETHASONE SODIUM PHOSPHATE 4 MG/ML
INJECTION, SOLUTION INTRA-ARTICULAR; INTRALESIONAL; INTRAMUSCULAR; INTRAVENOUS; SOFT TISSUE
Status: DISCONTINUED | OUTPATIENT
Start: 2022-03-20 | End: 2022-03-20

## 2022-03-20 RX ORDER — METHOCARBAMOL 500 MG/1
500 TABLET, FILM COATED ORAL 4 TIMES DAILY PRN
Status: DISCONTINUED | OUTPATIENT
Start: 2022-03-20 | End: 2022-03-23 | Stop reason: HOSPADM

## 2022-03-20 RX ORDER — FENTANYL CITRATE 50 UG/ML
INJECTION, SOLUTION INTRAMUSCULAR; INTRAVENOUS
Status: DISCONTINUED | OUTPATIENT
Start: 2022-03-20 | End: 2022-03-20

## 2022-03-20 RX ADMIN — HYDROMORPHONE HYDROCHLORIDE 1 MG: 1 INJECTION, SOLUTION INTRAMUSCULAR; INTRAVENOUS; SUBCUTANEOUS at 02:03

## 2022-03-20 RX ADMIN — PHENYLEPHRINE HYDROCHLORIDE 100 MCG: 10 INJECTION INTRAVENOUS at 09:03

## 2022-03-20 RX ADMIN — SODIUM CHLORIDE 0.2 MCG/KG/MIN: 9 INJECTION, SOLUTION INTRAVENOUS at 09:03

## 2022-03-20 RX ADMIN — EPHEDRINE SULFATE 5 MG: 50 INJECTION INTRAVENOUS at 09:03

## 2022-03-20 RX ADMIN — MIDAZOLAM HYDROCHLORIDE 1 MG: 1 INJECTION, SOLUTION INTRAMUSCULAR; INTRAVENOUS at 08:03

## 2022-03-20 RX ADMIN — OXYCODONE 5 MG: 5 TABLET ORAL at 12:03

## 2022-03-20 RX ADMIN — PROPOFOL 200 MG: 10 INJECTION, EMULSION INTRAVENOUS at 08:03

## 2022-03-20 RX ADMIN — ONDANSETRON HYDROCHLORIDE 4 MG: 2 INJECTION INTRAMUSCULAR; INTRAVENOUS at 11:03

## 2022-03-20 RX ADMIN — OXYCODONE 5 MG: 5 TABLET ORAL at 10:03

## 2022-03-20 RX ADMIN — REMIFENTANIL HYDROCHLORIDE 0.2 MCG/KG/MIN: 1 INJECTION, POWDER, LYOPHILIZED, FOR SOLUTION INTRAVENOUS at 09:03

## 2022-03-20 RX ADMIN — DEXAMETHASONE SODIUM PHOSPHATE 8 MG: 4 INJECTION, SOLUTION INTRAMUSCULAR; INTRAVENOUS at 09:03

## 2022-03-20 RX ADMIN — Medication 20 MG: at 09:03

## 2022-03-20 RX ADMIN — GLYCOPYRROLATE 0.2 MG: 0.2 INJECTION, SOLUTION INTRAMUSCULAR; INTRAVITREAL at 09:03

## 2022-03-20 RX ADMIN — SODIUM CHLORIDE: 0.9 INJECTION, SOLUTION INTRAVENOUS at 07:03

## 2022-03-20 RX ADMIN — SUCCINYLCHOLINE CHLORIDE 140 MG: 20 INJECTION, SOLUTION INTRAMUSCULAR; INTRAVENOUS at 08:03

## 2022-03-20 RX ADMIN — DEXTROSE 1 G: 50 INJECTION, SOLUTION INTRAVENOUS at 06:03

## 2022-03-20 RX ADMIN — DEXMEDETOMIDINE HYDROCHLORIDE 8 MCG: 100 INJECTION, SOLUTION, CONCENTRATE INTRAVENOUS at 11:03

## 2022-03-20 RX ADMIN — CEFAZOLIN 2 G: 330 INJECTION, POWDER, FOR SOLUTION INTRAMUSCULAR; INTRAVENOUS at 09:03

## 2022-03-20 RX ADMIN — LIDOCAINE HYDROCHLORIDE 100 MG: 20 INJECTION, SOLUTION INTRAVENOUS at 08:03

## 2022-03-20 RX ADMIN — Medication 10 MG: at 10:03

## 2022-03-20 RX ADMIN — SODIUM CHLORIDE: 0.9 INJECTION, SOLUTION INTRAVENOUS at 12:03

## 2022-03-20 RX ADMIN — OXYCODONE 5 MG: 5 TABLET ORAL at 05:03

## 2022-03-20 RX ADMIN — PROPOFOL 150 MCG/KG/MIN: 10 INJECTION, EMULSION INTRAVENOUS at 09:03

## 2022-03-20 RX ADMIN — FENTANYL CITRATE 50 MCG: 50 INJECTION, SOLUTION INTRAMUSCULAR; INTRAVENOUS at 08:03

## 2022-03-20 RX ADMIN — ROCURONIUM BROMIDE 5 MG: 10 INJECTION, SOLUTION INTRAVENOUS at 08:03

## 2022-03-20 RX ADMIN — ACETAMINOPHEN 1000 MG: 10 INJECTION, SOLUTION INTRAVENOUS at 11:03

## 2022-03-20 RX ADMIN — SODIUM CHLORIDE, SODIUM GLUCONATE, SODIUM ACETATE, POTASSIUM CHLORIDE, MAGNESIUM CHLORIDE, SODIUM PHOSPHATE, DIBASIC, AND POTASSIUM PHOSPHATE: .53; .5; .37; .037; .03; .012; .00082 INJECTION, SOLUTION INTRAVENOUS at 09:03

## 2022-03-20 RX ADMIN — DEXAMETHASONE SODIUM PHOSPHATE 4 MG: 4 INJECTION, SOLUTION INTRAMUSCULAR; INTRAVENOUS at 11:03

## 2022-03-20 RX ADMIN — FENTANYL CITRATE 50 MCG: 50 INJECTION, SOLUTION INTRAMUSCULAR; INTRAVENOUS at 11:03

## 2022-03-20 NOTE — SUBJECTIVE & OBJECTIVE
Interval History: Admitted overnight to Neurosurgery service.     Medications:  Continuous Infusions:   sodium chloride 0.9% 100 mL/hr at 03/20/22 0015     Scheduled Meds:  PRN Meds:dextrose 10%, dextrose 10%, glucagon (human recombinant), glucose, glucose, glucose, hydrALAZINE, labetaloL     Review of Systems  Objective:     Weight: 81.6 kg (180 lb)  Body mass index is 30.9 kg/m².  Vital Signs (Most Recent):  Temp: 97.7 °F (36.5 °C) (03/20/22 0003)  Pulse: (!) 55 (03/20/22 0003)  Resp: 14 (03/20/22 0003)  BP: (!) 150/77 (03/20/22 0003)  SpO2: 99 % (03/20/22 0003)   Vital Signs (24h Range):  Temp:  [97.5 °F (36.4 °C)-98.8 °F (37.1 °C)] 97.7 °F (36.5 °C)  Pulse:  [53-71] 55  Resp:  [14-18] 14  SpO2:  [94 %-99 %] 99 %  BP: (129-155)/(58-86) 150/77                          Physical Exam  General: well developed, well nourished, no distress.   Head: normocephalic, atraumatic  Cervical Spine: No midline tenderness to palpation.  Thoracolumbosacral Spine: No midline tenderness to palpation.  GCS: Motor: 6/Verbal: 5/Eyes: 4 GCS Total: 15  Mental Status: Awake, Alert, Oriented x 4  Language: No aphasia  Speech: No dysarthria  Facial Droop: None   Cranial nerves: CN III-XII grossly intact.  Visual Fields: Intact.   Eyes: Pupils equal and reactive to light. Intact Conjugate horizontal and vertical pursuit. No nystagmus. No gaze deviation.   Pulmonary: No distress.  Sensory: No deficit.  Propioception: No deficit in 1st digit of toe bilaterally.  Rectal Tone: Not tested.  Drift: None.  Upper Extremity Ataxia: No Dysmetria Bilaterally  Lower Extremity Ataxia: Not tested.  Dysdiadochokinesia: Not tested.  Reflexes: 2+ patellar bilaterally.  Medel: Absent  Clonus: Absent  Babinski: Absent  Romberg: Positive.  Pulses: Brisk and symmetric radial, DP and tibial pulses.  Motor Strength:     Strength   Shoulder Abduction Elbow Extension Elbow Flexion Wrist Extension Wrist Flexion Finger Opposition Finger Add Finger Abd   Upper: R  5/5 5/5 5/5 5/5 5/5 5/5 5/5 5/5     L 5/5 5/5 5/5 4-/5 5/5 4/5 5/5 5/5       Hip Flexion Knee Extension Knee  Flexion Ankle Dflexion Ankle Pflexion EHL       Lower: R 5/5 5/5 5/5 5/5 5/5 5/5         L 5/5 5/5 5/5 5/5 5/5 5/5               Neurosurgery Physical Exam    Significant Labs:  Recent Labs   Lab 03/18/22 2006 03/19/22  0801   * 91    137   K 4.2 3.8    104   CO2 23 25   BUN 10 7   CREATININE 0.8 0.7   CALCIUM 9.7 9.1       Recent Labs   Lab 03/18/22 2006 03/19/22  0801   WBC 9.96 8.58   HGB 13.6* 12.9*   HCT 41.6 40.7    344       Recent Labs   Lab 03/18/22 2006   INR 1.0   APTT 27.9       Microbiology Results (last 7 days)       ** No results found for the last 168 hours. **          All pertinent labs from the last 24 hours have been reviewed.    Significant Diagnostics:  I have reviewed all pertinent imaging results/findings within the past 24 hours.

## 2022-03-20 NOTE — PLAN OF CARE
Problem: Adult Inpatient Plan of Care  Goal: Optimal Comfort and Wellbeing  Outcome: Ongoing, Progressing     Problem: Fall Injury Risk  Goal: Absence of Fall and Fall-Related Injury  Outcome: Ongoing, Progressing      Pedro Blank

## 2022-03-20 NOTE — ASSESSMENT & PLAN NOTE
55 yo male with pmh of lumbar spondylosis s/p DEBORAH (3/8/22) since with workup concerning for subacute cervical spondylytic myelopathy secondary to central stenosis at C3-C4. Neurologically stable on exam.     To OR today for ACDF.      --Admit to neurosurgery under floor status.   Begin q4 neurochecks.  --Plan for C3-4 ACDF today  --All imaging reviewed.    MRI with C3-4 severe stenosis/signal change from herniated disc, multilevel subaxial cervical spine stenosis   CT C Spine without ossification at C3-4 and minimal non-continuous ossification of PLL.    Flexion extension Xray without dynamic instability.   --Continue rigid cervical othrosis when out of bed.   --We will continue to monitor closely, please contact us with any questions or concerns.

## 2022-03-20 NOTE — PROGRESS NOTES
Patient admitted to recovery see [facility] for complete assessment pacu bcgs maintained safety measures verified patient instructed on pain scale and patient verbalized understanding. Called for telemetry box and continous pulse ox to be delivered also called patient's mom and updated on patient location with the permission of patient. Neuro resident at bedside and neuro check with extremities being done patient moving everything and no numbness or tingling bilateral scd's on and working. Patient asked for urinal due to having to void will monitor.[id number] called to check about telemetry monitor and pulse ox called and spoke with kp in monitor room the hospital doesn't have any at this time and they are continuing to look for more monitors. Called charge resp. And she brought me a continous pulse ox machine .

## 2022-03-20 NOTE — ANESTHESIA PROCEDURE NOTES
Intubation    Date/Time: 3/20/2022 8:58 AM  Performed by: Adrianne Pacheco CRNA  Authorized by: Desire Guzman MD     Intubation:     Induction:  Intravenous    Intubated:  Postinduction    Mask Ventilation:  Easy mask    Attempts:  1    Attempted By:  CRNA    Method of Intubation:  Video laryngoscopy    Blade:  Glidescope 3    Laryngeal View Grade: Grade I - full view of cords      Difficult Airway Encountered?: No      Complications:  None    Airway Device:  Oral endotracheal tube    Airway Device Size:  7.5    Style/Cuff Inflation:  Cuffed    Inflation Amount (mL):  6    Tube secured:  23    Secured at:  The lips    Placement Verified By:  Capnometry    Complicating Factors:  Poor neck/head extension    Findings Post-Intubation:  BS equal bilateral and Poor bilateral BS

## 2022-03-20 NOTE — BRIEF OP NOTE
[facility] - Surgery ([facility])  Brief Operative Note  Neurosurgery    SUMMARY     Surgery Date: 3/20/2022     Surgeon(s) and Role:     * Michelle Johnson MD - Primary     * Greg Harding MD - Resident - Assisting        Pre-op Diagnosis:  Myelopathy [G95.9]    Post-op Diagnosis: Post-Op Diagnosis Codes:     * Myelopathy [G95.9]    Procedure Performed:  Procedure(s) (LRB):  C3-C4 Anterior Cervical Discectomy and Fusion (N/A)    Technical Procedures Used:   Left anterior neck dissection  C3-C4 ACDF   1 HV drain    Operative Findings: see full op note for details    Estimated Blood Loss: 25cc         Specimens:   Specimen (24h ago, onward)            None

## 2022-03-20 NOTE — PROGRESS NOTES
Devin Bennett - Neurosurgery ([facility])  Neurosurgery  Progress Note    Subjective:     History of Present Illness: 55 yo male with known history of lumbar spondylosis recently referred for DEBORAH on 3/8/22. Pt says following procedure, he had full resolution of related radicular symptoms but acutely developed signficant gait imbalance requiring a walker to ambulate. Pt does not complain subjectively of focal weakness, and says that since event symptoms have not worsened but intermittently improved and relapsed to current baseline. Pt was seen and assessed in clinic today and referred to ED for evaluation. No bowel or bladder symptoms. Pt has no other medical history and take no home medications.         Post-Op Info:  Procedure(s) (LRB):  C3-C4 Anterior Cervical Discectomy and Fusion (N/A)         Interval History: Admitted overnight to Neurosurgery service.     Medications:  Continuous Infusions:   sodium chloride 0.9% 100 mL/hr at 03/20/22 0015     Scheduled Meds:  PRN Meds:dextrose 10%, dextrose 10%, glucagon (human recombinant), glucose, glucose, glucose, hydrALAZINE, labetaloL     Review of Systems  Objective:     Weight: 81.6 kg (180 lb)  Body mass index is 30.9 kg/m².  Vital Signs (Most Recent):  Temp: 97.7 °F (36.5 °C) (03/20/22 0003)  Pulse: (!) 55 (03/20/22 0003)  Resp: 14 (03/20/22 0003)  BP: (!) 150/77 (03/20/22 0003)  SpO2: 99 % (03/20/22 0003)   Vital Signs (24h Range):  Temp:  [97.5 °F (36.4 °C)-98.8 °F (37.1 °C)] 97.7 °F (36.5 °C)  Pulse:  [53-71] 55  Resp:  [14-18] 14  SpO2:  [94 %-99 %] 99 %  BP: (129-155)/(58-86) 150/77                          Physical Exam  General: well developed, well nourished, no distress.   Head: normocephalic, atraumatic  Cervical Spine: No midline tenderness to palpation.  Thoracolumbosacral Spine: No midline tenderness to palpation.  GCS: Motor: 6/Verbal: 5/Eyes: 4 GCS Total: 15  Mental Status: Awake, Alert, Oriented x 4  Language: No aphasia  Speech: No dysarthria  Facial  Droop: None   Cranial nerves: CN III-XII grossly intact.  Visual Fields: Intact.   Eyes: Pupils equal and reactive to light. Intact Conjugate horizontal and vertical pursuit. No nystagmus. No gaze deviation.   Pulmonary: No distress.  Sensory: No deficit.  Propioception: No deficit in 1st digit of toe bilaterally.  Rectal Tone: Not tested.  Drift: None.  Upper Extremity Ataxia: No Dysmetria Bilaterally  Lower Extremity Ataxia: Not tested.  Dysdiadochokinesia: Not tested.  Reflexes: 2+ patellar bilaterally.  Medel: Absent  Clonus: Absent  Babinski: Absent  Romberg: Positive.  Pulses: Brisk and symmetric radial, DP and tibial pulses.  Motor Strength:     Strength   Shoulder Abduction Elbow Extension Elbow Flexion Wrist Extension Wrist Flexion Finger Opposition Finger Add Finger Abd   Upper: R 5/5 5/5 5/5 5/5 5/5 5/5 5/5 5/5     L 5/5 5/5 5/5 4-/5 5/5 4/5 5/5 5/5       Hip Flexion Knee Extension Knee  Flexion Ankle Dflexion Ankle Pflexion EHL       Lower: R 5/5 5/5 5/5 5/5 5/5 5/5         L 5/5 5/5 5/5 5/5 5/5 5/5               Neurosurgery Physical Exam    Significant Labs:  Recent Labs   Lab 03/18/22 2006 03/19/22  0801   * 91    137   K 4.2 3.8    104   CO2 23 25   BUN 10 7   CREATININE 0.8 0.7   CALCIUM 9.7 9.1       Recent Labs   Lab 03/18/22 2006 03/19/22  0801   WBC 9.96 8.58   HGB 13.6* 12.9*   HCT 41.6 40.7    344       Recent Labs   Lab 03/18/22 2006   INR 1.0   APTT 27.9       Microbiology Results (last 7 days)       ** No results found for the last 168 hours. **          All pertinent labs from the last 24 hours have been reviewed.    Significant Diagnostics:  I have reviewed all pertinent imaging results/findings within the past 24 hours.    Assessment/Plan:     * Cervical spondylosis with myelopathy and radiculopathy  55 yo male with pmh of lumbar spondylosis s/p DEBORAH (3/8/22) since with workup concerning for subacute cervical spondylytic myelopathy secondary to central  stenosis at C3-C4. Neurologically stable on exam.     To OR today for ACDF.      --Admit to neurosurgery under floor status.   Begin q4 neurochecks.  --Plan for C3-4 ACDF today  --All imaging reviewed.    MRI with C3-4 severe stenosis/signal change from herniated disc, multilevel subaxial cervical spine stenosis   CT C Spine without ossification at C3-4 and minimal non-continuous ossification of PLL.    Flexion extension Xray without dynamic instability.   --Continue rigid cervical othrosis when out of bed.   --We will continue to monitor closely, please contact us with any questions or concerns.           Garfield Little MD  Neurosurgery  Devin Bennett - Neurosurgery ([facility])

## 2022-03-20 NOTE — TRANSFER OF CARE
"Anesthesia Transfer of Care Note    Patient: Vicente Greenwood    Procedure(s) Performed: Procedure(s) (LRB):  C3-C4 Anterior Cervical Discectomy and Fusion (N/A)    Patient location: PACU    Anesthesia Type: general    Transport from OR: Transported from OR on 6-10 L/min O2 by face mask with adequate spontaneous ventilation    Post pain: adequate analgesia    Post assessment: no apparent anesthetic complications and tolerated procedure well    Post vital signs: stable    Level of consciousness: awake and alert    Nausea/Vomiting: no nausea/vomiting    Complications: none    Transfer of care protocol was followed      Last vitals:   Visit Vitals  BP (!) 163/84 (BP Location: Right arm, Patient Position: Sitting)   Pulse 84   Temp 36.7 °C (98.1 °F) (Oral)   Resp 20   Ht 5' 4" (1.626 m)   Wt 81.6 kg (179 lb 14.3 oz)   SpO2 100%   BMI 30.88 kg/m²     "

## 2022-03-20 NOTE — OP NOTE
Devin Bennett - Surgery ([facility])  Neurosurgery  Operative Note    SUMMARY      Date of Procedure: 3/20/2022     Procedure: Procedure(s) (LRB):  C3-C4 Anterior Cervical Discectomy and Fusion (N/A)     Surgeon(s) and Role:     * Michelle Johnson MD - Primary     * Greg Harding MD - Resident - Assisting    Pre-Operative Diagnosis: Myelopathy [G95.9]    Post-Operative Diagnosis: Post-Op Diagnosis Codes:     * Myelopathy [G95.9]    Anesthesia: General    Technical Procedures Used:  1. Placement of Santacruz-Wells tongs and traction   2. Anterior approach to the cervical spine   3. Discectomy with decompression of PLL, C3-C4  4. Placement of [id number] mm lordotic SpineWave [place] Anterior Cervical Cage  5. Application of 1cc Xtant DBM putty (morselized allograft)   6. Use of intraoperative microscope   7. Use of intraoperative neuromonitoring     Indications:   Vicente Greenwood is a 56 y.o. male with severe spinal cord compression at C3-C4 with resultant myelomalacia and myelopathy.      I have recommended a C3-C4 anterior cervical fusion for decompression of the cord. We discussed the risks, benefits, and alternatives to surgery. Risks include, but are not limited to, bleeding, pain, infection, scarring, need for further/repeat procedure, death, paralysis, damage to vocal cords, damage to esophagus, damage to trachea, stroke/damage to major blood vessels, leak of cerebrospinal fluid, adjacent segment disease, and hardware-related complications. We discussed that he will need to wear a rigid collar for 6 weeks. We also discussed that the risk of blood transfusion includes transfusion reaction and transmission of blood borne diseases including HIV and hepatitis. Informed consent was obtained.      Description of the Procedure:   The patient was brought back to the operating room, and taking care not to let MAPs fall below 85, general endotracheal anesthesia was induced by the anesthesia service. His head was carefully  positioned on the [place] head conde, and Santac[employer] tongs were placed. At this point SSEP and MEP baselines were obtained. MEPs were present in two out of four extremities, and SSEPs were reliable. After those baselines were obtained, 10 lbs of traction were applied. SSEPs and MEPs were re-checked and found to be improved after this positioning. All pressure points were carefully padded. TEDs and SCDs were applied and 2 grams of IV Ancef and 8mg of Dexamethasone were administered.      The C-arm was brought in to localize the C3-C4 disc space. The skin incision was marked in a Hair line, and the patient was prepped and draped in the usual sterile fashion. 5cc of 1% lidocaine with epinephrine were used to infiltrate the skin. A 15 blade was used to incise the skin, and dissection was carried down to the level of the platysma with Bovie cautery. The platysma was then elevated and divided. We then undermined the platysma in both rostral and caudal direction, using bipolar cautery at 20 as needed.      The medial border of the sternocleidomastoid was identified, and we dissected dorsally along its edge. The carotid was palpated and brought laterally. A Cloward retractor was used to gently retract the esophagus, and the spine was identified. A lateral X-ray confirmed our level. We next elevated the longus colli up bilaterally to be able to place the Trimline retractor blades. These were successfully inserted into the self-retaining retractor. The microscope was brought in.      The disc was removed with a combination of straight and upgoing curettes. We placed [place] pins to be parallel with the disc spaces. We used a disc-space  to achieve even distraction with the self-retainer. We visualized the uncovertebral joints bilaterally. Using a high-speed drill with an M8 bit, we carefully drilled the dorsalmost third of the inferior endplate of C3 and the superior endplate of C4.  The PLL was then gently  dissected, layer by layer, using a combination of blunt nerve hook and Kerrison #1 rongeur.   A series of thin-footplate upgoing curettes was used to free the additional disc material from behind the C3 and C4 bodies. We also felt out the bilateral foramina and found that the C4 nerve roots were completely free.      MEPs were evaluated and found to be stable. We then turned our attention to trialing the Tate cages. The 6mm lordotic cage was a good fit and did not cause overdistraction of the facets per lateral X-ray. We thus proceeded with placing a 6m cage with [place] distraction released. Motor evoked potentials were monitored at each phase and found to be stable. The 10lbs of traction were removed after the graft was placed, and MEPs remained stable on the right and changed slightly on the left but then returned to baseline. Lateral X-ray confirmed appropriate placement of the graft and subsequent placement of the angled securing plates. The cage was backfilled with allograft. The [place] pins were removed under direct visualization, and Surgiflo was applied to the pin sites for hemostasis.      We then removed the self-retaining retractors and obtained meticulous hemostasis. We irrigated copiously with antibiotic-containing solution. Depo-medrol was applied to the esophagus, which was inspected and did not appear to be damaged. We performed a two-layer closure with 3-0 Vicryl inverted stitches in the platysma and subcutaneous layers, with a running subcuticular 4-0 Monocryl for the skin. A sterile dressing of Mastisol, steri-strips, Telfa, and Tegaderm was applied. Final AP/lateral X-rays were obtained.  All counts were correct x2. Cervical EMG was quiet throughout the case.      The patient was then awakened by the anesthesia service and returned to the recovery area in satisfactory condition.      Complications: No     Estimated Blood Loss (EBL): 15 cc           Specimens:   Specimen (24h ago, onward)             None           Implants:   Implant Name Type Inv. Item Serial No.  Lot No. LRB No. Used Action   PIN DISTRACTION DISP 14MM - [id number]  PIN DISTRACTION DISP 14MM  B [facility] [id number] N/A 2 Implanted and Explanted   CAGE PARAMOUNT LORDTC [id number] - [id number]  CAGE PARAMOUNT LORDTC [id number]  [employer] [id number] N/A 1 Implanted   PUTTY OSTEOSELECT IN SYR 1CC - [id number]  PUTTY OSTEOSELECT IN SYR 1CC  BACTERIN [id number] N/A 1 Implanted              Condition: Stable    Disposition: PACU - hemodynamically stable.    Attestation: I was present and scrubbed for the entire procedure.

## 2022-03-20 NOTE — PLAN OF CARE
Problem: Adult Inpatient Plan of Care  Goal: Plan of Care Review  Outcome: Ongoing, Progressing  Goal: Absence of Hospital-Acquired Illness or Injury  Outcome: Ongoing, Progressing  Goal: Optimal Comfort and Wellbeing  Outcome: Ongoing, Progressing     Problem: Fall Injury Risk  Goal: Absence of Fall and Fall-Related Injury  Outcome: Ongoing, Progressing     POC reviewed with pt. Pt verbalized understanding. VSS. Full assessment in flowsheets. Bed locked in lowest position. Bed alarm set. Call light within reach. HV drain to left neck intact. Collar when OOB. PRN pain meds given. NAEO

## 2022-03-21 PROBLEM — R03.0 ELEVATED BP WITHOUT DIAGNOSIS OF HYPERTENSION: Status: ACTIVE | Noted: 2022-03-21

## 2022-03-21 LAB
ANION GAP SERPL CALC-SCNC: 7 MMOL/L (ref 8–16)
BASOPHILS # BLD AUTO: 0.01 K/UL (ref 0–0.2)
BASOPHILS NFR BLD: 0.1 % (ref 0–1.9)
BUN SERPL-MCNC: 6 MG/DL (ref 6–20)
CALCIUM SERPL-MCNC: 9.6 MG/DL (ref 8.7–10.5)
CHLORIDE SERPL-SCNC: 105 MMOL/L (ref 95–110)
CO2 SERPL-SCNC: 27 MMOL/L (ref 23–29)
CREAT SERPL-MCNC: 0.7 MG/DL (ref 0.5–1.4)
DIFFERENTIAL METHOD: ABNORMAL
EOSINOPHIL # BLD AUTO: 0 K/UL (ref 0–0.5)
EOSINOPHIL NFR BLD: 0.1 % (ref 0–8)
ERYTHROCYTE [DISTWIDTH] IN BLOOD BY AUTOMATED COUNT: 13.2 % (ref 11.5–14.5)
EST. GFR  (AFRICAN AMERICAN): >60 ML/MIN/1.73 M^2
EST. GFR  (NON AFRICAN AMERICAN): >60 ML/MIN/1.73 M^2
GLUCOSE SERPL-MCNC: 110 MG/DL (ref 70–110)
HCT VFR BLD AUTO: 41.2 % (ref 40–54)
HGB BLD-MCNC: 13.8 G/DL (ref 14–18)
IMM GRANULOCYTES # BLD AUTO: 0.04 K/UL (ref 0–0.04)
IMM GRANULOCYTES NFR BLD AUTO: 0.3 % (ref 0–0.5)
LYMPHOCYTES # BLD AUTO: 2.5 K/UL (ref 1–4.8)
LYMPHOCYTES NFR BLD: 17.1 % (ref 18–48)
MCH RBC QN AUTO: 28.8 PG (ref 27–31)
MCHC RBC AUTO-ENTMCNC: 33.5 G/DL (ref 32–36)
MCV RBC AUTO: 86 FL (ref 82–98)
MONOCYTES # BLD AUTO: 1.1 K/UL (ref 0.3–1)
MONOCYTES NFR BLD: 7.6 % (ref 4–15)
NEUTROPHILS # BLD AUTO: 10.9 K/UL (ref 1.8–7.7)
NEUTROPHILS NFR BLD: 74.8 % (ref 38–73)
NRBC BLD-RTO: 0 /100 WBC
PLATELET # BLD AUTO: 376 K/UL (ref 150–450)
PMV BLD AUTO: 9.1 FL (ref 9.2–12.9)
POTASSIUM SERPL-SCNC: 4 MMOL/L (ref 3.5–5.1)
RBC # BLD AUTO: 4.79 M/UL (ref 4.6–6.2)
SODIUM SERPL-SCNC: 139 MMOL/L (ref 136–145)
WBC # BLD AUTO: 14.54 K/UL (ref 3.9–12.7)

## 2022-03-21 PROCEDURE — 63600175 PHARM REV CODE 636 W HCPCS: Performed by: PHYSICIAN ASSISTANT

## 2022-03-21 PROCEDURE — 63600175 PHARM REV CODE 636 W HCPCS: Performed by: STUDENT IN AN ORGANIZED HEALTH CARE EDUCATION/TRAINING PROGRAM

## 2022-03-21 PROCEDURE — 25000003 PHARM REV CODE 250: Performed by: PHYSICIAN ASSISTANT

## 2022-03-21 PROCEDURE — 99024 PR POST-OP FOLLOW-UP VISIT: ICD-10-PCS | Mod: ,,, | Performed by: PHYSICIAN ASSISTANT

## 2022-03-21 PROCEDURE — 80048 BASIC METABOLIC PNL TOTAL CA: CPT | Performed by: STUDENT IN AN ORGANIZED HEALTH CARE EDUCATION/TRAINING PROGRAM

## 2022-03-21 PROCEDURE — 85025 COMPLETE CBC W/AUTO DIFF WBC: CPT | Performed by: STUDENT IN AN ORGANIZED HEALTH CARE EDUCATION/TRAINING PROGRAM

## 2022-03-21 PROCEDURE — 36415 COLL VENOUS BLD VENIPUNCTURE: CPT | Performed by: STUDENT IN AN ORGANIZED HEALTH CARE EDUCATION/TRAINING PROGRAM

## 2022-03-21 PROCEDURE — 99024 POSTOP FOLLOW-UP VISIT: CPT | Mod: ,,, | Performed by: PHYSICIAN ASSISTANT

## 2022-03-21 PROCEDURE — 11000001 HC ACUTE MED/SURG PRIVATE ROOM

## 2022-03-21 PROCEDURE — 97162 PT EVAL MOD COMPLEX 30 MIN: CPT

## 2022-03-21 PROCEDURE — 99231 SBSQ HOSP IP/OBS SF/LOW 25: CPT | Mod: ,,, | Performed by: HOSPITALIST

## 2022-03-21 PROCEDURE — 97165 OT EVAL LOW COMPLEX 30 MIN: CPT

## 2022-03-21 PROCEDURE — 99231 PR SUBSEQUENT HOSPITAL CARE,LEVL I: ICD-10-PCS | Mod: ,,, | Performed by: HOSPITALIST

## 2022-03-21 PROCEDURE — 97116 GAIT TRAINING THERAPY: CPT

## 2022-03-21 PROCEDURE — 94761 N-INVAS EAR/PLS OXIMETRY MLT: CPT

## 2022-03-21 PROCEDURE — 97535 SELF CARE MNGMENT TRAINING: CPT

## 2022-03-21 PROCEDURE — 25000003 PHARM REV CODE 250: Performed by: STUDENT IN AN ORGANIZED HEALTH CARE EDUCATION/TRAINING PROGRAM

## 2022-03-21 RX ORDER — AMOXICILLIN 250 MG
1 CAPSULE ORAL DAILY PRN
Status: CANCELLED | OUTPATIENT
Start: 2022-03-21

## 2022-03-21 RX ORDER — AMOXICILLIN 250 MG
1 CAPSULE ORAL DAILY
Status: DISCONTINUED | OUTPATIENT
Start: 2022-03-21 | End: 2022-03-23 | Stop reason: HOSPADM

## 2022-03-21 RX ORDER — HEPARIN SODIUM 5000 [USP'U]/ML
5000 INJECTION, SOLUTION INTRAVENOUS; SUBCUTANEOUS EVERY 8 HOURS
Status: DISCONTINUED | OUTPATIENT
Start: 2022-03-21 | End: 2022-03-23 | Stop reason: HOSPADM

## 2022-03-21 RX ORDER — LACTULOSE 10 G/15ML
15 SOLUTION ORAL DAILY
Status: DISCONTINUED | OUTPATIENT
Start: 2022-03-21 | End: 2022-03-23 | Stop reason: HOSPADM

## 2022-03-21 RX ORDER — CEPHALEXIN 250 MG/1
500 CAPSULE ORAL EVERY 6 HOURS
Status: DISCONTINUED | OUTPATIENT
Start: 2022-03-21 | End: 2022-03-23 | Stop reason: HOSPADM

## 2022-03-21 RX ORDER — POLYETHYLENE GLYCOL 3350 17 G/17G
17 POWDER, FOR SOLUTION ORAL DAILY PRN
Status: DISCONTINUED | OUTPATIENT
Start: 2022-03-21 | End: 2022-03-23 | Stop reason: HOSPADM

## 2022-03-21 RX ADMIN — HEPARIN SODIUM 5000 UNITS: 5000 INJECTION INTRAVENOUS; SUBCUTANEOUS at 11:03

## 2022-03-21 RX ADMIN — HEPARIN SODIUM 5000 UNITS: 5000 INJECTION INTRAVENOUS; SUBCUTANEOUS at 05:03

## 2022-03-21 RX ADMIN — CEPHALEXIN 500 MG: 250 CAPSULE ORAL at 05:03

## 2022-03-21 RX ADMIN — OXYCODONE 5 MG: 5 TABLET ORAL at 06:03

## 2022-03-21 RX ADMIN — SENNOSIDES AND DOCUSATE SODIUM 1 TABLET: 50; 8.6 TABLET ORAL at 01:03

## 2022-03-21 RX ADMIN — DEXTROSE 1 G: 50 INJECTION, SOLUTION INTRAVENOUS at 09:03

## 2022-03-21 RX ADMIN — OXYCODONE 5 MG: 5 TABLET ORAL at 10:03

## 2022-03-21 RX ADMIN — METHOCARBAMOL 500 MG: 500 TABLET ORAL at 09:03

## 2022-03-21 RX ADMIN — LACTULOSE 15 G: 20 SOLUTION ORAL at 01:03

## 2022-03-21 RX ADMIN — DEXTROSE 1 G: 50 INJECTION, SOLUTION INTRAVENOUS at 12:03

## 2022-03-21 NOTE — SUBJECTIVE & OBJECTIVE
Interval History:  AFVSS. Patient reports great improvement in upper extremity strength. Ambulating well with therapy. Voiding spontaneously. Denies flatus - bowel regimen increased. F/u BM/flatus. Anterior neck drain with 40 cc outupt - removed. Patient tolerated procedure well. Start 5 days keflex. PT/OT recs for rehab.     Medications:  Continuous Infusions:  Scheduled Meds:   cephALEXin  500 mg Oral Q6H    lactulose  15 g Oral Daily    senna-docusate 8.6-50 mg  1 tablet Oral Daily     PRN Meds:acetaminophen, dextrose 10%, dextrose 10%, glucagon (human recombinant), glucose, glucose, glucose, methocarbamoL, oxyCODONE, polyethylene glycol     Review of Systems  Objective:     Weight: 81.6 kg (179 lb 14.3 oz)  Body mass index is 30.88 kg/m².  Vital Signs (Most Recent):  Temp: 97.3 °F (36.3 °C) (03/21/22 0757)  Pulse: 71 (03/21/22 1055)  Resp: 16 (03/21/22 1043)  BP: (!) 150/89 (03/21/22 0757)  SpO2: (!) 92 % (03/21/22 0757)   Vital Signs (24h Range):  Temp:  [96.9 °F (36.1 °C)-98.9 °F (37.2 °C)] 97.3 °F (36.3 °C)  Pulse:  [71-89] 71  Resp:  [14-18] 16  SpO2:  [92 %-97 %] 92 %  BP: (150-175)/(78-94) 150/89     Date 03/21/22 0700 - 03/22/22 0659   Shift 1825-9552 4470-4019 0715-0718 24 Hour Total   INTAKE   Shift Total(mL/kg)       OUTPUT   Urine(mL/kg/hr) 750   750   Shift Total(mL/kg) 750(9.2)   750(9.2)   Weight (kg) 81.6 81.6 81.6 81.6                        Closed/Suction Drain 03/20/22 1140 Anterior Neck Accordion 10 Fr. (Active)   Dressing Type Other (Comment) 03/20/22 1500   Dressing Status Other (Comment) 03/20/22 1500   Drainage Serosanguineous 03/20/22 2000   Status To bulb suction 03/20/22 1215   Output (mL) 40 mL 03/20/22 1300     Neurosurgery Physical Exam  General: well developed, well nourished, no distress.   Head: normocephalic, atraumatic  Neck: No tracheal deviation. No palpable masses. Full ROM.   Neurologic: Alert and oriented. Thought content appropriate.  GCS: E4 V5 M6. GCS Total: 15  Mental  Status: Awake, Alert, Oriented x 4  Language: No aphasia  Speech: No dysarthria  Cranial nerves: face symmetric, tongue midline, CN II-XII grossly intact.   Eyes: pupils equal, round, reactive to light with accomodation, EOMI.   Pulmonary: normal respirations, no signs of respiratory distress  Abdomen: soft, non-distended, not tender to palpation    Sensory: intact to light touch throughout  Motor Strength: Moves all extremities spontaneously with good tone.  No abnormal movements seen.     Strength  Deltoids Triceps Biceps Wrist Extension Wrist Flexion Hand    Upper: R 5/5 5/5 5/5 5/5 5/5 5/5    L 5/5 5/5 5/5 5/5 5/5 4+/5     Strength  Iliopsoas Quadriceps Knee  Flexion Tibialis  anterior Gastro- cnemius EHL   Lower: R 5/5 5/5 5/5 5/5 5/5 5/5    L 5/5 5/5 5/5 5/5 5/5 5/5     Vascular: No LE edema.   Skin: Skin is warm, dry and intact.    Reflexes:   Medel's: Negative  Clonus: Negative    Incision: c/d/i with skin edges well approximated with Dermabond. No surrounding erythema or edema. No drainage from incision. No palpable hematoma or underlying fluid collection.      Significant Labs:  Recent Labs   Lab 03/20/22  0400 03/21/22  0737   GLU 95 110    139   K 3.9 4.0    105   CO2 25 27   BUN 8 6   CREATININE 0.6 0.7   CALCIUM 9.2 9.6     Recent Labs   Lab 03/20/22  0400 03/21/22  0737   WBC 9.97 14.54*   HGB 13.3* 13.8*   HCT 40.8 41.2    376     Recent Labs   Lab 03/20/22  0400   INR 1.1   APTT 28.5     Microbiology Results (last 7 days)       ** No results found for the last 168 hours. **          All pertinent labs from the last 24 hours have been reviewed.    Significant Diagnostics:  I have reviewed all pertinent imaging results/findings within the past 24 hours.

## 2022-03-21 NOTE — PLAN OF CARE
Problem: Physical Therapy Goal  Goal: Physical Therapy Goal  Description: Goals to be met by: 22    Patient will increase functional independence with mobility by performin. Supine to sit with modified independence  2. Sit to supine with modified independence  3. Sit to stand transfer with modified independence  4. Gait  x 100 feet with modified independence using LRAD as needed  5. Lower extremity exercise program x10 reps per handout, with independence  7. Maintain spinal precautions 100% of the time    Outcome: Ongoing, Progressing     Pt tolerated PT session well.      All needs met, all questions answered.  Derek Walden PT, DPT

## 2022-03-21 NOTE — DISCHARGE INSTRUCTIONS
Post op Spine Patient Instructions    Activity Restrictions:  [x]  Return to work will be determined on an individual basis.  [x]  No lifting greater than 5-10 pounds.  [x]  Avoid bending and twisting the area of your surgery more than 45 degrees from neutral position in any direction.  [x]  No driving or operating machinery:  [x]  until cleared by your surgeon.  [x]  while taking narcotic pain medications or muscle relaxants.  [x]  Wear your brace at all times except when lying in bed, showering, using the restroom, or performing hygiene tasks.   [x]  Increase ambulation over the next 2 weeks so that you are walking 2 miles per day at 2 weeks post-operatively.  [x]  Walk on paved surfaces only. It is okay to walk up and down stairs while holding onto a side rail.  [x]  No sexual activity for 6 weeks.    Discharge Medication/Follow-up:  [x]  Please refer to discharge medication reconciliation form.  [x]  Do not take any OTC products containing acetaminophen (tylenol) at the same time as you take your narcotic pain medication. Medications that may contain acetaminophen include but are not limited to: Excedrin and other headache medications, arthritis medications, cold and sinus medications, etc. Please review the list of active ingredients in any OTC medication prior to taking it.  [x]  Do not take ANY Aspirin or Aspirin containing products for 2 weeks after surgery.   [x]  Do not take ANY herbal supplements for 2 weeks after surgery.    [x]  Do not take ANY non-steroidal anti-inflammatory drugs (NSAIDS), including the following: ibuprofen, naprosyn, Aleve, Advil, Indocin, Mobic, or Celebrex for 12 weeks after surgery.   [x]  Prescriptions for appropriate medication will be given upon discharge.  [x]  Take docusate (Colace 100 mg): take one capsule a day as needed for constipation. You can get this over the counter.  [x]  Follow-up appointment:  [x]  10-14 days post-op for wound check by physician  assistant/nurse  [x]  4-6 weeks with MD:  [x]  with x-rays  [x]  An appointment will be mailed to you.    Wound Care:  [x]  No bandage required. Keep your incision open to the air. You have dermabond covering your incision. This will begin to flake off over the next 2 weeks. Do not remove this on your own, allow it to peel off. Do not apply ointments or creams to your incision  [x]  You may shower on the 2nd day after your surgery. Keep the incision clean and dry at all times. Do not allow the force of water to hit the incision. If the incision gets damp, pat it dry. Do not rub or scrub the incision.  [x]  You cannot take a bath until 8 weeks after surgery.    Call your doctor or go to the Emergency Room for any signs of infection, including: increased redness, drainage, pain, or fever (temperature ?101.5 for 24 hours). Call your doctor or go to the Emergency Room if there are any localized neurological changes; problems with speech, vision, numbness, tingling, weakness, or severe headache; inability to control urination or bowel movements; inability to urinate; or for other concerns.            Special Instructions:  [x]  No use of tobacco products.  [x]  Diet: Please eat a regular diet as tolerated.      Physicians need 3 days' notice for pain medicine to be refilled. Pain medicine will only be refilled between 8 AM and 5 PM, Monday through Friday, due to Food and Drug Administration regulation of documentation.    If you have any questions about this form, please call [phone].    Form No. 96958 (Revised 10/31/2013)

## 2022-03-21 NOTE — PLAN OF CARE
Problem: Occupational Therapy Goal  Goal: Occupational Therapy Goal  Description: Goals to be met by: 4/21/2022     Patient will increase functional independence with ADLs by performing:    UE Dressing with [place].  LE Dressing with [place].  Grooming while standing with Supervision.  Toileting from toilet with Supervision for hygiene and clothing management.   Rolling to Bilateral with [place].   Supine to sit with [place].  Step transfer with Supervision  Toilet transfer to toilet with Supervision.    Evaluated pt and established OT POC. Continue OT as tolerated.  Clarissa Daily OT  3/21/2022    Outcome: Ongoing, Progressing

## 2022-03-21 NOTE — ASSESSMENT & PLAN NOTE
57 yo male with pmh of lumbar spondylosis s/p DEBORAH (3/8/22) since with workup concerning for subacute cervical spondylytic myelopathy secondary to central stenosis at C3-C4. Neurologically stable on exam.     To OR today for ACDF.      --Admit to neurosurgery under floor status.   Begin q4 neurochecks.  --Plan for C3-4 ACDF today  --All imaging reviewed.    MRI with C3-4 severe stenosis/signal change from herniated disc, multilevel subaxial cervical spine stenosis   CT C Spine without ossification at C3-4 and minimal non-continuous ossification of PLL.    Flexion extension Xray without dynamic instability.   --Continue rigid cervical othrosis when out of bed.   --All pertinent labs and diagnostics personally reviewed.  --Imaging: post op xrays obtained with appropriate hardware placement  --Pain control: Well controlled on current regimen  --DVT ppx: TEDs/SCDs/SQH  --Activity: PT/OT, OOB. C-collar when OOB. Recs for rehab.   --Diet: Regular  --Bowel regimen: Lactulose, senna. PRN miralax  --Urinary: Voiding spontaneously  --Atelectasis ppx: Encourage IS hourly  --We will continue to monitor closely, please contact us with any questions or concerns.    Dispo: Rehab vs home with HH    Discussed with Dr. Johnson

## 2022-03-21 NOTE — PROGRESS NOTES
Devin Bennett - Neurosurgery ([facility])  Neurosurgery  Progress Note    Subjective:     History of Present Illness: 55 yo male with known history of lumbar spondylosis recently referred for DEBORAH on 3/8/22. Pt says following procedure, he had full resolution of related radicular symptoms but acutely developed signficant gait imbalance requiring a walker to ambulate. Pt does not complain subjectively of focal weakness, and says that since event symptoms have not worsened but intermittently improved and relapsed to current baseline. Pt was seen and assessed in clinic today and referred to ED for evaluation. No bowel or bladder symptoms. Pt has no other medical history and take no home medications.         Post-Op Info:  Procedure(s) (LRB):  C3-C4 Anterior Cervical Discectomy and Fusion (N/A)   1 Day Post-Op     Interval History: POD 1 s/p ACDF. AFVSS. Patient reports great improvement in upper extremity strength. Ambulating well with therapy. Voiding spontaneously. Denies flatus - bowel regimen increased. F/u BM/flatus. Anterior neck drain with 40 cc outupt - removed. Patient tolerated procedure well. Start 5 days keflex. PT/OT recs for rehab.     Medications:  Continuous Infusions:  Scheduled Meds:   cephALEXin  500 mg Oral Q6H    lactulose  15 g Oral Daily    senna-docusate 8.6-50 mg  1 tablet Oral Daily     PRN Meds:acetaminophen, dextrose 10%, dextrose 10%, glucagon (human recombinant), glucose, glucose, glucose, methocarbamoL, oxyCODONE, polyethylene glycol     Review of Systems  Objective:     Weight: 81.6 kg (179 lb 14.3 oz)  Body mass index is 30.88 kg/m².  Vital Signs (Most Recent):  Temp: 97.3 °F (36.3 °C) (03/21/22 0757)  Pulse: 71 (03/21/22 1055)  Resp: 16 (03/21/22 1043)  BP: (!) 150/89 (03/21/22 0757)  SpO2: (!) 92 % (03/21/22 0757)   Vital Signs (24h Range):  Temp:  [96.9 °F (36.1 °C)-98.9 °F (37.2 °C)] 97.3 °F (36.3 °C)  Pulse:  [71-89] 71  Resp:  [14-18] 16  SpO2:  [92 %-97 %] 92 %  BP: (150-175)/(78-94)  150/89     Date 03/21/22 0700 - 03/22/22 0659   Shift 5355-6023 9788-7878 8770-5284 24 Hour Total   INTAKE   Shift Total(mL/kg)       OUTPUT   Urine(mL/kg/hr) 750   750   Shift Total(mL/kg) 750(9.2)   750(9.2)   Weight (kg) 81.6 81.6 81.6 81.6                        Closed/Suction Drain 03/20/22 1140 Anterior Neck Accordion 10 Fr. (Active)   Dressing Type Other (Comment) 03/20/22 1500   Dressing Status Other (Comment) 03/20/22 1500   Drainage Serosanguineous 03/20/22 2000   Status To bulb suction 03/20/22 1215   Output (mL) 40 mL 03/20/22 1300     Neurosurgery Physical Exam  General: well developed, well nourished, no distress.   Head: normocephalic, atraumatic  Neck: No tracheal deviation. No palpable masses. Full ROM.   Neurologic: Alert and oriented. Thought content appropriate.  GCS: E4 V5 M6. GCS Total: 15  Mental Status: Awake, Alert, Oriented x 4  Language: No aphasia  Speech: No dysarthria  Cranial nerves: face symmetric, tongue midline, CN II-XII grossly intact.   Eyes: pupils equal, round, reactive to light with accomodation, EOMI.   Pulmonary: normal respirations, no signs of respiratory distress  Abdomen: soft, non-distended, not tender to palpation    Sensory: intact to light touch throughout  Motor Strength: Moves all extremities spontaneously with good tone.  No abnormal movements seen.     Strength  Deltoids Triceps Biceps Wrist Extension Wrist Flexion Hand    Upper: R 5/5 5/5 5/5 5/5 5/5 5/5    L 5/5 5/5 5/5 5/5 5/5 4+/5     Strength  Iliopsoas Quadriceps Knee  Flexion Tibialis  anterior Gastro- cnemius EHL   Lower: R 5/5 5/5 5/5 5/5 5/5 5/5    L 5/5 5/5 5/5 5/5 5/5 5/5     Vascular: No LE edema.   Skin: Skin is warm, dry and intact.    Reflexes:   Medel's: Negative  Clonus: Negative    Incision: c/d/i with skin edges well approximated with Dermabond. No surrounding erythema or edema. No drainage from incision. No palpable hematoma or underlying fluid collection.      Significant Labs:  Recent  Labs   Lab 03/20/22  0400 03/21/22  0737   GLU 95 110    139   K 3.9 4.0    105   CO2 25 27   BUN 8 6   CREATININE 0.6 0.7   CALCIUM 9.2 9.6     Recent Labs   Lab 03/20/22  0400 03/21/22  0737   WBC 9.97 14.54*   HGB 13.3* 13.8*   HCT 40.8 41.2    376     Recent Labs   Lab 03/20/22  0400   INR 1.1   APTT 28.5     Microbiology Results (last 7 days)       ** No results found for the last 168 hours. **          All pertinent labs from the last 24 hours have been reviewed.    Significant Diagnostics:  I have reviewed all pertinent imaging results/findings within the past 24 hours.    Assessment/Plan:     * Cervical spondylosis with myelopathy and radiculopathy  57 yo male with pmh of lumbar spondylosis s/p DEBORAH (3/8/22) since with workup concerning for subacute cervical spondylytic myelopathy secondary to central stenosis at C3-C4. Neurologically stable on exam.      --Admit to neurosurgery under floor status.   Continue q4h neuro checks  --POD1 s/p C3-C4 ACDF.  --All imaging reviewed.    MRI with C3-4 severe stenosis/signal change from herniated disc, multilevel subaxial cervical spine stenosis   CT C Spine without ossification at C3-4 and minimal non-continuous ossification of PLL.    Flexion extension Xray without dynamic instability.   --Continue rigid cervical othrosis when out of bed.   --All pertinent labs and diagnostics personally reviewed.  --Imaging: post op xrays obtained with appropriate hardware placement  --Pain control: Well controlled on current regimen  --DVT ppx: TEDs/SCDs/SQH  --Activity: PT/OT, OOB. C-collar when OOB. Recs for rehab.   --Diet: Regular  --Bowel regimen: Lactulose, senna. PRN miralax  --Urinary: Voiding spontaneously  --Atelectasis ppx: Encourage IS hourly  --We will continue to monitor closely, please contact us with any questions or concerns.    Dispo: Rehab vs home with HH    Discussed with Dr. Elizabeth Cherry, PA-C  Ne[facility]  [facility]  [facility] ([facility])

## 2022-03-21 NOTE — ASSESSMENT & PLAN NOTE
55 yo male with pmh of lumbar spondylosis s/p DEBORAH (3/8/22) since with workup concerning for subacute cervical spondylytic myelopathy secondary to central stenosis at C3-C4. Neurologically stable on exam.       --Admit to neurosurgery under floor status.              -q4 neurochecks.  --All imaging reviewed.    MRI with C3-4 severe stenosis/signal change from herniated disc, multilevel subaxial cervical spine stenosis   CT C Spine without ossification at C3-4 and minimal non-continuous ossification of PLL.    Flexion extension Xray without dynamic instability.   --Continue rigid cervical othrosis when out of bed.   --All pertinent labs and diagnostics personally reviewed.  --Imaging: post op xrays obtained with appropriate hardware placement  --Pain control: Well controlled on current regimen.  --DVT ppx: TEDs/SCDs/SQH.  --Activity: PT/OT, OOB. C-collar when OOB. Recs for rehab.   --Diet: Regular  --Bowel regimen: Lactulose, senna. PRN miralax.  --Urinary: Voiding spontaneously.  --Atelectasis ppx: Encourage IS hourly.  --We will continue to monitor closely, please contact us with any questions or concerns.    Dispo: Pending rehab acceptance.     Discussed with Dr. Johnson
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
## 2022-03-21 NOTE — PLAN OF CARE
Problem: Adult Inpatient Plan of Care  Goal: Optimal Comfort and Wellbeing  Outcome: Ongoing, Progressing     Problem: Adult Inpatient Plan of Care  Goal: Readiness for Transition of Care  Outcome: Ongoing, Progressing     Problem: Fall Injury Risk  Goal: Absence of Fall and Fall-Related Injury  Outcome: Ongoing, Progressing

## 2022-03-21 NOTE — HOSPITAL COURSE
3/19:  3/20:  3/21: Patient reports great improvement in upper extremity strength. Ambulating well with therapy. Voiding spontaneously. Denies flatus - bowel regimen increased. F/u BM/flatus. Anterior neck drain with 40 cc outupt - removed. Patient tolerated procedure well. Start 5 days keflex. PT/OT recs for rehab.   3/22: Patient neurologically stable and strength improving. Denies new symptoms and states his pain is well controlled. Small BM today. Pending rehab acceptance.   3/23:

## 2022-03-21 NOTE — PLAN OF CARE
Devin Bennett - Neurosurgery (Hospital)  Initial Discharge Assessment       Primary Care Provider: Cesar Herrera MD    Admission Diagnosis: Myelopathy [G95.9]  Preoperative clearance [Z01.818]    Admission Date: 3/18/2022  Expected Discharge Date:     Discharge Barriers Identified: None    Payor: BLUE CROSS BLUE SHIELD / Plan: [employer] HMO / Product Type: HMO /     Extended Emergency Contact Information  Primary Emergency Contact: Cherelle Greenwood  Home Phone: [phone]  Mobile Phone: [phone]  Relation: Mother    Discharge Plan A: Home with family, Home Health  Discharge Plan B: Home with family      [id number] [place] [facility] [address] [place] [address]  [facility] [address]  Phone: [phone] Fax: [phone]      Initial Assessment (most recent)     Adult Discharge Assessment - 03/21/22 1315        Discharge Assessment    Assessment Type Discharge Planning Assessment     Confirmed/corrected address, phone number and insurance Yes     Confirmed Demographics Correct on Facesheet     Source of Information patient     Communicated ROSALIA with patient/caregiver Yes     Reason For Admission cervical spondylosis     Lives With parent(s)     Do you expect to return to your current living situation? Yes     Do you have help at home or someone to help you manage your care at home? Yes     Who are your caregiver(s) and their phone number(s)? Cherelle Greenwood - mother [phone]     Prior to hospitilization cognitive status: Alert/Oriented     Current cognitive status: Alert/Oriented     Walking or Climbing Stairs Difficulty ambulation difficulty, requires equipment     Mobility Management purchased an upright walker, has not used yet.     Dressing/Bathing Difficulty none     Equipment Currently Used at Home other (see comments)   upright walker    Readmission within 30 days? No     Patient currently being followed by outpatient case management?  No     Do you currently have service(s) that help you manage your care at home? No     Do you take prescription medications? Yes     Do you have prescription coverage? Yes     Coverage BLUE CROSS BLUE SHIELD - BCBS OF [facility]     Do you have any problems affording any of your prescribed medications? No     Is the patient taking medications as prescribed? yes     Who is going to help you get home at discharge? family     How do you get to doctors appointments? family or friend will provide     Are you on dialysis? No     Do you take coumadin? No     Discharge Plan A Home with family;Home Health     Discharge Plan B Home with family     DME Needed Upon Discharge  none     Discharge Plan discussed with: Patient     Discharge Barriers Identified None        Relationship/Environment    Name(s) of Who Lives With Patient Cherelle - mother

## 2022-03-21 NOTE — ASSESSMENT & PLAN NOTE
S/p ACDF 3/20.  HTN overnight post-operatively.   Suspect 2/2 pain.     - would hold on adding scheduled antihypertensive regimen  - pain control per NSGY  - would recommend against prn IV anti-hypertensive meds

## 2022-03-21 NOTE — SUBJECTIVE & OBJECTIVE
Interval History:   NAEON. VSS aside from HTN. Received prn hydralazine overnight. Back pain improved with prn opiates.     Review of Systems   Constitutional:  Negative for chills, diaphoresis and fever.   HENT:  Negative for congestion and rhinorrhea.    Eyes:  Negative for discharge and redness.   Respiratory:  Negative for cough, chest tightness and shortness of breath.    Cardiovascular:  Negative for chest pain, palpitations and leg swelling.   Gastrointestinal:  Negative for abdominal distention, abdominal pain, constipation, diarrhea, nausea and vomiting.   Genitourinary:  Negative for difficulty urinating, dysuria and frequency.   Musculoskeletal:  Positive for arthralgias, back pain and gait problem.   Skin:  Negative for pallor and wound.   Neurological:  Positive for weakness. Negative for facial asymmetry.   Psychiatric/Behavioral:  Negative for behavioral problems and confusion.    Objective:     Vital Signs (Most Recent):  Temp: 97.3 °F (36.3 °C) (03/21/22 0757)  Pulse: 71 (03/21/22 1055)  Resp: 16 (03/21/22 1043)  BP: (!) 150/89 (03/21/22 0757)  SpO2: (!) 92 % (03/21/22 0757)   Vital Signs (24h Range):  Temp:  [96.9 °F (36.1 °C)-98.9 °F (37.2 °C)] 97.3 °F (36.3 °C)  Pulse:  [63-89] 71  Resp:  [14-20] 16  SpO2:  [92 %-98 %] 92 %  BP: (150-175)/(78-94) 150/89  Arterial Line BP: (150-186)/(80-86) 155/82     Weight: 81.6 kg (179 lb 14.3 oz)  Body mass index is 30.88 kg/m².    Physical Exam  Constitutional:       General: He is not in acute distress.     Appearance: Normal appearance. He is not ill-appearing, toxic-appearing or diaphoretic.   HENT:      Head: Normocephalic and atraumatic.      Nose: No congestion or rhinorrhea.      Mouth/Throat:      Mouth: Mucous membranes are moist.      Pharynx: Oropharynx is clear.   Eyes:      General: No scleral icterus.     Conjunctiva/sclera: Conjunctivae normal.      Pupils: Pupils are equal, round, and reactive to light.   Neck:      Comments: No  JVD  Cardiovascular:      Rate and Rhythm: Normal rate and regular rhythm.      Pulses: Normal pulses.      Heart sounds: Normal heart sounds. No murmur heard.    No friction rub. No gallop.   Pulmonary:      Effort: Pulmonary effort is normal. No respiratory distress.      Breath sounds: Normal breath sounds. No stridor. No wheezing or rales.   Abdominal:      General: Abdomen is flat. Bowel sounds are normal. There is no distension.      Tenderness: There is no abdominal tenderness. There is no guarding.   Musculoskeletal:      Cervical back: Normal range of motion and neck supple.      Right lower leg: No edema.      Left lower leg: No edema.   Skin:     General: Skin is warm and dry.   Neurological:      General: No focal deficit present.      Mental Status: He is alert and oriented to person, place, and time. Mental status is at baseline.      Motor: Weakness present.      Gait: Gait abnormal.   Psychiatric:         Behavior: Behavior normal.         Thought Content: Thought content normal.       Significant Labs: All pertinent labs within the past 24 hours have been reviewed.    Significant Imaging: I have reviewed all pertinent imaging results/findings within the past 24 hours.

## 2022-03-21 NOTE — PLAN OF CARE
Patient alert verbally responsive blood pressure 170/90 treated for pain per request. Patient drain was removed today no drainage noted at surgical site. Therapy has worked with the patient today ambulating in [address]az[address] way with use of a walker. Appetite has been good, urine output good as well. Will continue to observe and address concerns as needed.      Doctors plan as follows:     Cervical spondylosis with myelopathy and radiculopathy  57 yo male with pmh of lumbar spondylosis s/p DEBORAH (3/8/22) since with workup concerning for subacute cervical spondylytic myelopathy secondary to central stenosis at C3-C4. Neurologically stable on exam.      --Admit to neurosurgery under floor status.          Continue q4h neuro checks  --POD1 s/p C3-C4 ACDF.  --All imaging reviewed.           MRI with C3-4 severe stenosis/signal change from herniated disc, multilevel subaxial cervical spine stenosis          CT C Spine without ossification at C3-4 and minimal non-continuous ossification of PLL.           Flexion extension Xray without dynamic instability.   --Continue rigid cervical othrosis when out of bed.   --All pertinent labs and diagnostics personally reviewed.  --Imaging: post op xrays obtained with appropriate hardware placement  --Pain control: Well controlled on current regimen  --DVT ppx: TEDs/SCDs/SQH  --Activity: PT/OT, OOB. C-collar when OOB. Recs for rehab.   --Diet: Regular  --Bowel regimen: Lactulose, senna. PRN miralax  --Urinary: Voiding spontaneously  --Atelectasis ppx: Encourage IS hourly  --We will continue to monitor closely, please contact us with any questions or concerns.       Problem: Fall Injury Risk  Goal: Absence of Fall and Fall-Related Injury  Outcome: Ongoing, Progressing     Problem: Adult Inpatient Plan of Care  Goal: Plan of Care Review  Outcome: Ongoing, Progressing
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
## 2022-03-22 PROBLEM — Z74.09 IMPAIRED FUNCTIONAL MOBILITY AND ENDURANCE: Status: ACTIVE | Noted: 2022-03-22

## 2022-03-22 LAB
ANION GAP SERPL CALC-SCNC: 8 MMOL/L (ref 8–16)
BASOPHILS # BLD AUTO: 0.05 K/UL (ref 0–0.2)
BASOPHILS NFR BLD: 0.4 % (ref 0–1.9)
BUN SERPL-MCNC: 9 MG/DL (ref 6–20)
CALCIUM SERPL-MCNC: 9.7 MG/DL (ref 8.7–10.5)
CHLORIDE SERPL-SCNC: 102 MMOL/L (ref 95–110)
CO2 SERPL-SCNC: 27 MMOL/L (ref 23–29)
CREAT SERPL-MCNC: 0.7 MG/DL (ref 0.5–1.4)
DIFFERENTIAL METHOD: ABNORMAL
EOSINOPHIL # BLD AUTO: 0 K/UL (ref 0–0.5)
EOSINOPHIL NFR BLD: 0.2 % (ref 0–8)
ERYTHROCYTE [DISTWIDTH] IN BLOOD BY AUTOMATED COUNT: 13.3 % (ref 11.5–14.5)
EST. GFR  (AFRICAN AMERICAN): >60 ML/MIN/1.73 M^2
EST. GFR  (NON AFRICAN AMERICAN): >60 ML/MIN/1.73 M^2
GLUCOSE SERPL-MCNC: 98 MG/DL (ref 70–110)
HCT VFR BLD AUTO: 42.1 % (ref 40–54)
HGB BLD-MCNC: 13.7 G/DL (ref 14–18)
IMM GRANULOCYTES # BLD AUTO: 0.04 K/UL (ref 0–0.04)
IMM GRANULOCYTES NFR BLD AUTO: 0.3 % (ref 0–0.5)
LYMPHOCYTES # BLD AUTO: 4.1 K/UL (ref 1–4.8)
LYMPHOCYTES NFR BLD: 31 % (ref 18–48)
MCH RBC QN AUTO: 28.4 PG (ref 27–31)
MCHC RBC AUTO-ENTMCNC: 32.5 G/DL (ref 32–36)
MCV RBC AUTO: 87 FL (ref 82–98)
MONOCYTES # BLD AUTO: 1.1 K/UL (ref 0.3–1)
MONOCYTES NFR BLD: 8.6 % (ref 4–15)
NEUTROPHILS # BLD AUTO: 7.8 K/UL (ref 1.8–7.7)
NEUTROPHILS NFR BLD: 59.5 % (ref 38–73)
NRBC BLD-RTO: 0 /100 WBC
PLATELET # BLD AUTO: 385 K/UL (ref 150–450)
PMV BLD AUTO: 9.4 FL (ref 9.2–12.9)
POTASSIUM SERPL-SCNC: 3.7 MMOL/L (ref 3.5–5.1)
RBC # BLD AUTO: 4.82 M/UL (ref 4.6–6.2)
SODIUM SERPL-SCNC: 137 MMOL/L (ref 136–145)
WBC # BLD AUTO: 13.14 K/UL (ref 3.9–12.7)

## 2022-03-22 PROCEDURE — 99024 PR POST-OP FOLLOW-UP VISIT: ICD-10-PCS | Mod: ,,,

## 2022-03-22 PROCEDURE — 11000001 HC ACUTE MED/SURG PRIVATE ROOM

## 2022-03-22 PROCEDURE — 36415 COLL VENOUS BLD VENIPUNCTURE: CPT | Performed by: STUDENT IN AN ORGANIZED HEALTH CARE EDUCATION/TRAINING PROGRAM

## 2022-03-22 PROCEDURE — 97116 GAIT TRAINING THERAPY: CPT

## 2022-03-22 PROCEDURE — 25000003 PHARM REV CODE 250: Performed by: STUDENT IN AN ORGANIZED HEALTH CARE EDUCATION/TRAINING PROGRAM

## 2022-03-22 PROCEDURE — 85025 COMPLETE CBC W/AUTO DIFF WBC: CPT | Performed by: STUDENT IN AN ORGANIZED HEALTH CARE EDUCATION/TRAINING PROGRAM

## 2022-03-22 PROCEDURE — 99222 PR INITIAL HOSPITAL CARE,LEVL II: ICD-10-PCS | Mod: ,,, | Performed by: NURSE PRACTITIONER

## 2022-03-22 PROCEDURE — 99024 POSTOP FOLLOW-UP VISIT: CPT | Mod: ,,,

## 2022-03-22 PROCEDURE — 99222 1ST HOSP IP/OBS MODERATE 55: CPT | Mod: ,,, | Performed by: NURSE PRACTITIONER

## 2022-03-22 PROCEDURE — 80048 BASIC METABOLIC PNL TOTAL CA: CPT | Performed by: STUDENT IN AN ORGANIZED HEALTH CARE EDUCATION/TRAINING PROGRAM

## 2022-03-22 PROCEDURE — 97110 THERAPEUTIC EXERCISES: CPT

## 2022-03-22 PROCEDURE — 63600175 PHARM REV CODE 636 W HCPCS: Performed by: PHYSICIAN ASSISTANT

## 2022-03-22 PROCEDURE — 94761 N-INVAS EAR/PLS OXIMETRY MLT: CPT

## 2022-03-22 PROCEDURE — 25000003 PHARM REV CODE 250: Performed by: PHYSICIAN ASSISTANT

## 2022-03-22 PROCEDURE — 97535 SELF CARE MNGMENT TRAINING: CPT

## 2022-03-22 RX ORDER — HYDRALAZINE HYDROCHLORIDE 25 MG/1
25 TABLET, FILM COATED ORAL EVERY 8 HOURS
Status: CANCELLED | OUTPATIENT
Start: 2022-03-22

## 2022-03-22 RX ADMIN — HEPARIN SODIUM 5000 UNITS: 5000 INJECTION INTRAVENOUS; SUBCUTANEOUS at 08:03

## 2022-03-22 RX ADMIN — ACETAMINOPHEN 650 MG: 325 TABLET ORAL at 12:03

## 2022-03-22 RX ADMIN — METHOCARBAMOL 500 MG: 500 TABLET ORAL at 10:03

## 2022-03-22 RX ADMIN — METHOCARBAMOL 500 MG: 500 TABLET ORAL at 08:03

## 2022-03-22 RX ADMIN — CEPHALEXIN 500 MG: 250 CAPSULE ORAL at 11:03

## 2022-03-22 RX ADMIN — LACTULOSE 15 G: 20 SOLUTION ORAL at 08:03

## 2022-03-22 RX ADMIN — CEPHALEXIN 500 MG: 250 CAPSULE ORAL at 05:03

## 2022-03-22 RX ADMIN — CEPHALEXIN 500 MG: 250 CAPSULE ORAL at 12:03

## 2022-03-22 RX ADMIN — SENNOSIDES AND DOCUSATE SODIUM 1 TABLET: 50; 8.6 TABLET ORAL at 08:03

## 2022-03-22 RX ADMIN — HEPARIN SODIUM 5000 UNITS: 5000 INJECTION INTRAVENOUS; SUBCUTANEOUS at 06:03

## 2022-03-22 RX ADMIN — ACETAMINOPHEN 650 MG: 325 TABLET ORAL at 08:03

## 2022-03-22 RX ADMIN — CEPHALEXIN 500 MG: 250 CAPSULE ORAL at 06:03

## 2022-03-22 RX ADMIN — METHOCARBAMOL 500 MG: 500 TABLET ORAL at 12:03
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
## 2022-03-22 NOTE — PLAN OF CARE
CM met with patient to discuss discharge plan.  Rec is inpatient rehab.  CM discussed options and patient chose [facility] as 1st choice and [facility] as 2nd.  Referrals sent.   03/22/22 0912   Post-Acute Status   Post-Acute Authorization Placement   Post-Acute Placement Status Referrals Sent

## 2022-03-22 NOTE — PT/OT/SLP PROGRESS
Occupational Therapy   Treatment    Name: Vicente Greenwood  MRN: [id number]  Admitting Diagnosis:  Cervical spondylosis with myelopathy and radiculopathy  2 Days Post-Op    Recommendations:     Discharge Recommendations: rehabilitation facility  Discharge Equipment Recommendations:  none  Barriers to discharge:  None    Assessment:     Vicente Greenwood is a 56 y.o. male with a medical diagnosis of Cervical spondylosis with myelopathy and radiculopathy.  He presents with impaired ADL and mobility performance defiicts. Pt found upright in chair and agreeable for therapy. Session focused on bedroom and bathroom mobility needed for safe grooming at sink. Pt also ambulated ~3 minutes in hallway using RW with fatigue expressed by pt. Pt required CGA for mobility however continues to show lateral sway and generalized unsteady gait 2/2 limited toe clearance and compensating hip movements. Pt currently is very motivated to return to [facility] and continue to practice  occupation at d/c.     At this time, pt is a high fall risk and is not safe to return home. Pt would benefit from continued OT skilled services 4x/wk to improve daily living skills to optimize QOL.Pt is recommended to discharge to rehab at this time.    Performance deficits affecting function are weakness, impaired self care skills, impaired endurance, impaired functional mobilty, gait instability, decreased lower extremity function, decreased upper extremity function, impaired balance, decreased coordination.     Rehab Prognosis:  Good; patient would benefit from acute skilled OT services to address these deficits and reach maximum level of function.       Plan:     Patient to be seen 4 x/week to address the above listed problems via self-care/home management, therapeutic activities, therapeutic exercises, neuromuscular re-education  · Plan of Care Expires: 04/21/22  · Plan of Care Reviewed with: patient    Subjective     Pain/Comfort:  · Pain Rating 1:  0/10  · Pain Rating Post-Intervention 1: 0/10  · Pain Rating Post-Intervention 2: 0/10    Objective:     Communicated with: RN prior to session.  Patient found up in chair with telemetry upon OT entry to room.    General Precautions: Standard, fall   Orthopedic Precautions:spinal precautions   Braces: Cervical collar  Respiratory Status: Room air     Occupational Performance:     Bed Mobility:    · NT as pt found upright in chair     Functional Mobility/Transfers:  · Patient completed Sit <> Stand Transfer with contact guard assistance  with  rolling walker   · Functional Mobility: Pt stood from chair and mobilized into restroom to complete 4 minutes of oral care. Pt then mobilized into hallway completing 3 additional min of gait training. Pt used RW with progressive deficits in LE weakness causing increased plantar flexion. Pt with noted BLE foot drop with decreased insight to toe drag requiring x3 adjustments of sock fit. Pt with lateral sway and decreased gait speed noted.    Activities of Daily Living:  · Grooming: contact guard assistance washing hands and completing oral care standing at sink  · Upper Body Dressing: minimum assistance donning gown seated   · Lower Body Dressing: minimum assistance donning  socks using figure 4 method       [facility] 6 Click ADL: 17    Treatment & Education:  Pt educated on role of occupational therapy, POC, and safety during ADLs and functional mobility. Pt and OT discussed importance of safe, continued mobility to optimize daily living skills. Pt verbalized understanding.   Pt completed the following during session: UB/LB dressing, sit<stand t/f, bedroom and bathroom mobility, standing oral care, safe setup in bed.   White board updated during session. Pt given instruction to call for medical staff/nurse for assistance.       Patient left up in chair with all lines intact, call button in reach and RN notifiedEducation:      GOALS:   Multidisciplinary Problems     Occupational  Therapy Goals        Problem: Occupational Therapy Goal    Goal Priority Disciplines Outcome Interventions   Occupational Therapy Goal     OT, PT/OT Ongoing, Progressing    Description: Goals to be met by: 4/21/2022     Patient will increase functional independence with ADLs by performing:    UE Dressing with [place].  LE Dressing with [place].  Grooming while standing with Supervision.  Toileting from toilet with Supervision for hygiene and clothing management.   Rolling to Bilateral with [place].   Supine to sit with [place].  Step transfer with Supervision  Toilet transfer to toilet with Supervision.                     Time Tracking:     OT Date of Treatment: 03/22/22  OT Start Time: 1058  OT Stop Time: 1114  OT Total Time (min): 16 min    Billable Minutes:Self Care/Home Management 16 min    OT/ANTIONE: OT          3/22/2022

## 2022-03-22 NOTE — PROGRESS NOTES
Devin Bennett - Neurosurgery ([facility])  Neurosurgery  Progress Note    Subjective:     History of Present Illness: 57 yo male with known history of lumbar spondylosis recently referred for DEBORAH on 3/8/22. Pt says following procedure, he had full resolution of related radicular symptoms but acutely developed signficant gait imbalance requiring a walker to ambulate. Pt does not complain subjectively of focal weakness, and says that since event symptoms have not worsened but intermittently improved and relapsed to current baseline. Pt was seen and assessed in clinic today and referred to ED for evaluation. No bowel or bladder symptoms. Pt has no other medical history and take no home medications.         Post-Op Info:  Procedure(s) (LRB):  C3-C4 Anterior Cervical Discectomy and Fusion (N/A)   2 Days Post-Op     Interval History:  Patient neurologically stable and strength improving. Denies new symptoms and states his pain is well controlled. Small BM today. Pending rehab acceptance.     Medications:  Continuous Infusions:  Scheduled Meds:   cephALEXin  500 mg Oral Q6H    heparin (porcine)  5,000 Units Subcutaneous Q8H    lactulose  15 g Oral Daily    senna-docusate 8.6-50 mg  1 tablet Oral Daily     PRN Meds:acetaminophen, dextrose 10%, dextrose 10%, glucagon (human recombinant), glucose, glucose, glucose, methocarbamoL, oxyCODONE, polyethylene glycol       Objective:     Weight: 81.6 kg (179 lb 14.3 oz)  Body mass index is 30.88 kg/m².  Vital Signs (Most Recent):  Temp: 98.5 °F (36.9 °C) (03/22/22 1125)  Pulse: 62 (03/22/22 1435)  Resp: 17 (03/22/22 1125)  BP: (!) 171/93 (03/22/22 1125)  SpO2: 98 % (03/22/22 1125)   Vital Signs (24h Range):  Temp:  [97.4 °F (36.3 °C)-99.4 °F (37.4 °C)] 98.5 °F (36.9 °C)  Pulse:  [58-76] 62  Resp:  [17-18] 17  SpO2:  [93 %-98 %] 98 %  BP: (157-180)/(77-93) 171/93       Neurosurgery Physical Exam  General: Well developed, well nourished, not in acute distress.   Head: Normocephalic,  atraumatic.  Neck: Full ROM.   Neurologic: Alert and oriented x 4. Thought content appropriate.  GCS: Motor:6  Verbal:5  Eyes:4   GCS Total: 15  Language: No aphasia.  Speech: No dysarthria.  Cranial nerves: Face symmetric, tongue midline, CN II-XII grossly intact.   Eyes: Pupils equal, round, reactive to light with accomodation, EOMI.   Pulmonary: Normal respirations, no signs of respiratory distress.  Abdomen: Soft, non-distended, non-tender to palpation.  Sensory: Intact to light touch throughout.  Motor Strength: Moves all extremities spontaneously with good tone. Full strength upper and lower extremities. No abnormal movements seen.     Strength  Deltoids Triceps Biceps Wrist Extension Wrist Flexion Hand    Upper: R 5/5 5/5 5/5 5/5 5/5 5/5    L 5/5 5/5 5/5 5/5 5/5 5/5     Hip Flexion Knee Extension Knee  Flexion Dorsiflexion Plantar flexion EHL   Lower: R 5/5 5/5 5/5 5/5 5/5 5/5    L 5/5 5/5 5/5 5/5 5/5 5/5     Vascular: No LE edema.   Skin: Skin is warm, dry and intact.    Significant Labs:  Recent Labs   Lab 03/21/22  0737 03/22/22  0722    98    137   K 4.0 3.7    102   CO2 27 27   BUN 6 9   CREATININE 0.7 0.7   CALCIUM 9.6 9.7     Recent Labs   Lab 03/21/22  0737 03/22/22  0722   WBC 14.54* 13.14*   HGB 13.8* 13.7*   HCT 41.2 42.1    385     No results for input(s): LABPT, INR, APTT in the last 48 hours.  Microbiology Results (last 7 days)       ** No results found for the last 168 hours. **          All pertinent labs from the last 24 hours have been reviewed.    Significant Diagnostics:  I have reviewed and interpreted all pertinent imaging results/findings within the past 24 hours.    Assessment/Plan:     * Cervical spondylosis with myelopathy and radiculopathy  55 yo male with pmh of lumbar spondylosis s/p DEBORAH (3/8/22) since with workup concerning for subacute cervical spondylytic myelopathy secondary to central stenosis at C3-C4. Neurologically stable on exam.        --Admit to neurosurgery under floor status.              -q4 neurochecks.  --All imaging reviewed.    MRI with C3-4 severe stenosis/signal change from herniated disc, multilevel subaxial cervical spine stenosis   CT C Spine without ossification at C3-4 and minimal non-continuous ossification of PLL.    Flexion extension Xray without dynamic instability.   --Continue rigid cervical othrosis when out of bed.   --All pertinent labs and diagnostics personally reviewed.  --Imaging: post op xrays obtained with appropriate hardware placement  --Pain control: Well controlled on current regimen.  --DVT ppx: TEDs/SCDs/SQH.  --Activity: PT/OT, OOB. C-collar when OOB. Recs for rehab.   --Diet: Regular  --Bowel regimen: Lactulose, senna. PRN miralax.  --Urinary: Voiding spontaneously.  --Atelectasis ppx: Encourage IS hourly.  --We will continue to monitor closely, please contact us with any questions or concerns.    Dispo: Pending rehab acceptance.     Discussed with CHIOMA AvilesC  Neurosurgery  Devin Bennett - Neurosurgery ([facility])

## 2022-03-22 NOTE — PT/OT/SLP PROGRESS
Physical Therapy  Treatment    Patient Name:  Vicente Greenwood   MRN:  [id number]    Recommendations:     Discharge Recommendations:  rehabilitation facility   Discharge Equipment Recommendations: none   Barriers to discharge: decreased functional mobility, decreased caregiver support and inaccessible home    Assessment:     Vicente Greenwood is a 56 y.o. male admitted with a medical diagnosis of Cervical spondylosis with myelopathy and radiculopathy.  Pt demonstrates the below listed impairments with decreased tolerance to functional mobility and gait instability being the most limiting.  PT demonstrates increased tolerance to out of bed mobility.  Cues for gait technique demonstrate improvement with stability and mechancis.  Pt is not safe for home discharge at this time due to patient's status as: a fall risk and pt has no 24/7 assist and requires skilled PT.      Impairments and functional limitations:  weakness, impaired endurance, impaired self care skills, impaired functional mobilty, gait instability, impaired balance, decreased coordination, decreased lower extremity function, decreased upper extremity function, orthopedic precautions, impaired skin.  These deficits affect their roles and responsibilities in which they were able to complete prior to admit.  Rehab Prognosis:   Good; patient would benefit from acute skilled PT services 4 x/week to address these deficits, improve quality of life, focus on recovery of impairments, provide patient/caregiver education, reduce fall risk, and reach maximum level of function.  Pt is highly  motivated to participated in skilled PT.    Recent Surgery:   Procedure(s) (LRB):  C3-C4 Anterior Cervical Discectomy and Fusion (N/A) 2 Days Post-Op    Plan:     During this hospitalization, patient to be seen 4 x/week to address the identified rehab impairments via gait training, therapeutic activities, therapeutic exercises, neuromuscular re-education and progress toward  the following goals:    · Plan of Care Expires:  04/20/22    Subjective     Chief Complaint: decreased tolerance to functional mobility  Patient/Family Comments/Goals: Progress to inpatient rehab  Pain/Comfort:  · Pain Rating 1: 0/10    Objective:     Communicated with RN prior to session.  Patient found HOB elevated with telemetry upon PT entry to room.     General Precautions: Standard, fall   Orthopedic Precautions:spinal precautions   Braces: Cervical collar  Oxygen Device:      Functional Mobility:  · Bed Mobility:  Rolling Right: stand by assistance  · Scooting: stand by assistance  · Supine to Sit: stand by assistance  · Head of bed position: HOB elevated    · Transfers:     · Sit to Stand: stand by assistance with rolling walker with cues for hand placement    · Gait: Patient ambulated 50'x4 with rolling walker and minimum assistance. Patient demonstrates unsteady gait, decreased step length, wide base of support, foot drop, decreased arm swing, hip hiking, flexed posture and decreased thais. All lines remained intact throughout ambulation trial, gait belt utilized, mask donned for out of room ambulation.   · Cues for posture, heel strike, and walker management this day  · c-collar donned    · Balance:   Position Score Time   Static Sitting GOOD: Takes MODERATE challenges n/a   Dynamic Sitting GOOD: Maintains balance through MODERATE excursions of active trunk motion n/a   Static Standing FAIR-: Maintains without assist but is inconsistent n/a   Dynamic Standing POOR+: MINIMAL assist to maintain n/a       AM-PAC 6 CLICK MOBILITY  Turning over in bed (including adjusting bedclothes, sheets and blankets)?: 3  Sitting down on and standing up from a chair with arms (e.g., wheelchair, bedside commode, etc.): 3  Moving from lying on back to sitting on the side of the bed?: 3  Moving to and from a bed to a chair (including a wheelchair)?: 3  Need to walk in hospital room?: 3  Climbing 3-5 steps with a railing?:  2  Basic Mobility Total Score: 17     Therapeutic Activities:  Patient educated on role of acute care PT and PT POC, safety while in hospital including calling nurse for mobility, call light usage, benefits of out of bed mobility, walker management, home exercise program , breathing technique, fall risk, assistive device use, bed mobility , transfers, gait technique, positioning, posture, risks of prolonged bed rest, possible discharge disposition , spinal precautions, benefits of continued PT and brace  by explanation and demonstration.    Patient demonstrates good understanding of education provided this day.   Whiteboard updated    Therapeutic Exercises:  Patient participated in: education and practice reps of AP, LAQs, hip marches, hip adduction with pillow, hip abduction with towel    Patient left up in chair with all lines intact, call button in reach and RN notified.    GOALS:   Multidisciplinary Problems     Physical Therapy Goals        Problem: Physical Therapy Goal    Goal Priority Disciplines Outcome Goal Variances Interventions   Physical Therapy Goal     PT, PT/OT Ongoing, Progressing     Description: Goals to be met by: 22    Patient will increase functional independence with mobility by performin. Supine to sit with modified independence  2. Sit to supine with modified independence  3. Sit to stand transfer with modified independence  4. Gait  x 100 feet with modified independence using LRAD as needed  5. Lower extremity exercise program x10 reps per handout, with independence  7. Maintain spinal precautions 100% of the time                     Time Tracking:     PT Received On: 22  PT Start Time: 1023     PT Stop Time: 1046  PT Total Time (min): 23 min     Billable Minutes: Gait Training 15 and Therapeutic Exercise 8    Treatment Type: Treatment  PT/PTA: PT     PTA Visit Number: 0     2022

## 2022-03-22 NOTE — ASSESSMENT & PLAN NOTE
- h/o lumbar spondylosis recently referred for DEBORAH on 3/8/22 and followed in NSGY clinic  -recent MRI cervical spine demonstrates nuclear herniation C3-C4 with thecal sac deformation and focal signal change per NSGY  - Now s/p C3-C4 ACDF on 3/21  - Continue rigid cervical othrosis when out of bed per NSGY  -needs post op BM

## 2022-03-22 NOTE — ANESTHESIA POSTPROCEDURE EVALUATION
Anesthesia Post Evaluation    Patient: Vicente Greenwood    Procedure(s) Performed: Procedure(s) (LRB):  C3-C4 Anterior Cervical Discectomy and Fusion (N/A)    Final Anesthesia Type: general      Patient location during evaluation: PACU  Patient participation: Yes- Able to Participate  Level of consciousness: awake and alert  Post-procedure vital signs: reviewed and stable  Pain management: adequate  Airway patency: patent    PONV status at discharge: No PONV  Anesthetic complications: no      Cardiovascular status: blood pressure returned to baseline  Respiratory status: unassisted  Hydration status: euvolemic  Follow-up not needed.          Vitals Value Taken Time   /77 03/22/22 0325   Temp 36.5 °C (97.7 °F) 03/22/22 0325   Pulse 58 03/22/22 0327   Resp 18 03/22/22 0325   SpO2 97 % 03/22/22 0325         Event Time   Out of Recovery 03/20/2022 12:45:00         Pain/Evie Score: Pain Rating Prior to Med Admin: 4 (3/22/2022 12:00 AM)  Evie Score: 10 (3/21/2022  8:00 AM)

## 2022-03-22 NOTE — PLAN OF CARE
Patient accepted by [facility] and auth submitted for.   03/22/22 1405   Post-Acute Status   Post-Acute Authorization Placement   Post-Acute Placement Status Pending payor review/awaiting authorization (if required)

## 2022-03-22 NOTE — CONSULTS
Inpatient consult to Physical Medicine Rehab  Consult performed by: Marybeth Christianson NP  Consult ordered by: Michelle Johnson MD  Reason for consult: assess rehab needs        Reviewed patient history and current admission.  PM&R following.     LYLE Chacon, FNP-C  Physical Medicine & Rehabilitation   03/22/2022

## 2022-03-22 NOTE — HOSPITAL COURSE
03/21/2022: Bed mobility SBA .  Sit to stand Min-CGA & RW and transfers Min & RW  Ambulated 50 ft x 2 Min & RW .  UBD TA.

## 2022-03-22 NOTE — PLAN OF CARE
Problem: Adult Inpatient Plan of Care  Goal: Plan of Care Review  Outcome: Ongoing, Progressing  Goal: Absence of Hospital-Acquired Illness or Injury  Outcome: Ongoing, Progressing  Goal: Optimal Comfort and Wellbeing  Outcome: Ongoing, Progressing     Problem: Fall Injury Risk  Goal: Absence of Fall and Fall-Related Injury  Outcome: Ongoing, Progressing     POC reviewed with pt. Pt verbalized understanding. VSS. Full assessment in flowsheets. Bed locked in lowest position. Bed alarm set. Call light within reach. CHARMAINE
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
## 2022-03-22 NOTE — HPI
Per chart review, Sean Greenwood is a 56-year-old male with PMHx of lumbar spondylosis recently referred for DEBORAH on 3/8/22 and followed in NSGY clinic.  Patient presented to [facility] on 3/18 with gait imbalance.  Recent MRI cervical spine demonstrates nuclear herniation C3-C4 with thecal sac deformation and focal signal change per NSGY. Now s/p C3-C4 ACDF on 3/21. Continue rigid cervical othrosis when out of bed per NSGY. Hospital course further complicated by impaired functional mobility.     Functional History: Patient lives with mother in a single story home with no steps to enter.  Prior to admission, (I) with ADLs and mobility until 02/2022 where he began to utilize a RW. DME: [employer].

## 2022-03-22 NOTE — SUBJECTIVE & OBJECTIVE
Interval History:  Patient neurologically stable and strength improving. Denies new symptoms and states his pain is well controlled. Small BM today. Pending rehab acceptance.     Medications:  Continuous Infusions:  Scheduled Meds:   cephALEXin  500 mg Oral Q6H    heparin (porcine)  5,000 Units Subcutaneous Q8H    lactulose  15 g Oral Daily    senna-docusate 8.6-50 mg  1 tablet Oral Daily     PRN Meds:acetaminophen, dextrose 10%, dextrose 10%, glucagon (human recombinant), glucose, glucose, glucose, methocarbamoL, oxyCODONE, polyethylene glycol       Objective:     Weight: 81.6 kg (179 lb 14.3 oz)  Body mass index is 30.88 kg/m².  Vital Signs (Most Recent):  Temp: 98.5 °F (36.9 °C) (03/22/22 1125)  Pulse: 62 (03/22/22 1435)  Resp: 17 (03/22/22 1125)  BP: (!) 171/93 (03/22/22 1125)  SpO2: 98 % (03/22/22 1125)   Vital Signs (24h Range):  Temp:  [97.4 °F (36.3 °C)-99.4 °F (37.4 °C)] 98.5 °F (36.9 °C)  Pulse:  [58-76] 62  Resp:  [17-18] 17  SpO2:  [93 %-98 %] 98 %  BP: (157-180)/(77-93) 171/93       Neurosurgery Physical Exam  General: Well developed, well nourished, not in acute distress.   Head: Normocephalic, atraumatic.  Neck: Full ROM.   Neurologic: Alert and oriented x 4. Thought content appropriate.  GCS: Motor:6  Verbal:5  Eyes:4   GCS Total: 15  Language: No aphasia.  Speech: No dysarthria.  Cranial nerves: Face symmetric, tongue midline, CN II-XII grossly intact.   Eyes: Pupils equal, round, reactive to light with accomodation, EOMI.   Pulmonary: Normal respirations, no signs of respiratory distress.  Abdomen: Soft, non-distended, non-tender to palpation.  Sensory: Intact to light touch throughout.  Motor Strength: Moves all extremities spontaneously with good tone. Full strength upper and lower extremities. No abnormal movements seen.     Strength  Deltoids Triceps Biceps Wrist Extension Wrist Flexion Hand    Upper: R 5/5 5/5 5/5 5/5 5/5 5/5    L 5/5 5/5 5/5 5/5 5/5 5/5     Hip Flexion Knee Extension  Knee  Flexion Dorsiflexion Plantar flexion EHL   Lower: R 5/5 5/5 5/5 5/5 5/5 5/5    L 5/5 5/5 5/5 5/5 5/5 5/5     Vascular: No LE edema.   Skin: Skin is warm, dry and intact.    Significant Labs:  Recent Labs   Lab 03/21/22  0737 03/22/22  0722    98    137   K 4.0 3.7    102   CO2 27 27   BUN 6 9   CREATININE 0.7 0.7   CALCIUM 9.6 9.7     Recent Labs   Lab 03/21/22  0737 03/22/22  0722   WBC 14.54* 13.14*   HGB 13.8* 13.7*   HCT 41.2 42.1    385     No results for input(s): LABPT, INR, APTT in the last 48 hours.  Microbiology Results (last 7 days)       ** No results found for the last 168 hours. **          All pertinent labs from the last 24 hours have been reviewed.    Significant Diagnostics:  I have reviewed and interpreted all pertinent imaging results/findings within the past 24 hours.

## 2022-03-23 ENCOUNTER — TELEPHONE (OUTPATIENT)
Dept: PAIN MEDICINE | Facility: CLINIC | Age: 57
End: 2022-03-23
Payer: COMMERCIAL

## 2022-03-23 VITALS
OXYGEN SATURATION: 96 % | SYSTOLIC BLOOD PRESSURE: 156 MMHG | HEIGHT: 64 IN | TEMPERATURE: 99 F | RESPIRATION RATE: 18 BRPM | WEIGHT: 179.88 LBS | HEART RATE: 69 BPM | BODY MASS INDEX: 30.71 KG/M2 | DIASTOLIC BLOOD PRESSURE: 90 MMHG

## 2022-03-23 LAB
ANION GAP SERPL CALC-SCNC: 6 MMOL/L (ref 8–16)
BASOPHILS # BLD AUTO: 0.06 K/UL (ref 0–0.2)
BASOPHILS NFR BLD: 0.6 % (ref 0–1.9)
BUN SERPL-MCNC: 9 MG/DL (ref 6–20)
CALCIUM SERPL-MCNC: 9.6 MG/DL (ref 8.7–10.5)
CHLORIDE SERPL-SCNC: 99 MMOL/L (ref 95–110)
CO2 SERPL-SCNC: 30 MMOL/L (ref 23–29)
CREAT SERPL-MCNC: 0.7 MG/DL (ref 0.5–1.4)
DIFFERENTIAL METHOD: ABNORMAL
EOSINOPHIL # BLD AUTO: 0.1 K/UL (ref 0–0.5)
EOSINOPHIL NFR BLD: 1.1 % (ref 0–8)
ERYTHROCYTE [DISTWIDTH] IN BLOOD BY AUTOMATED COUNT: 13.2 % (ref 11.5–14.5)
EST. GFR  (AFRICAN AMERICAN): >60 ML/MIN/1.73 M^2
EST. GFR  (NON AFRICAN AMERICAN): >60 ML/MIN/1.73 M^2
GLUCOSE SERPL-MCNC: 111 MG/DL (ref 70–110)
HCT VFR BLD AUTO: 41.6 % (ref 40–54)
HGB BLD-MCNC: 13.7 G/DL (ref 14–18)
IMM GRANULOCYTES # BLD AUTO: 0.02 K/UL (ref 0–0.04)
IMM GRANULOCYTES NFR BLD AUTO: 0.2 % (ref 0–0.5)
LYMPHOCYTES # BLD AUTO: 4 K/UL (ref 1–4.8)
LYMPHOCYTES NFR BLD: 42.3 % (ref 18–48)
MCH RBC QN AUTO: 28.7 PG (ref 27–31)
MCHC RBC AUTO-ENTMCNC: 32.9 G/DL (ref 32–36)
MCV RBC AUTO: 87 FL (ref 82–98)
MONOCYTES # BLD AUTO: 0.8 K/UL (ref 0.3–1)
MONOCYTES NFR BLD: 8.7 % (ref 4–15)
NEUTROPHILS # BLD AUTO: 4.4 K/UL (ref 1.8–7.7)
NEUTROPHILS NFR BLD: 47.1 % (ref 38–73)
NRBC BLD-RTO: 0 /100 WBC
PLATELET # BLD AUTO: 374 K/UL (ref 150–450)
PMV BLD AUTO: 8.9 FL (ref 9.2–12.9)
POTASSIUM SERPL-SCNC: 3.7 MMOL/L (ref 3.5–5.1)
RBC # BLD AUTO: 4.78 M/UL (ref 4.6–6.2)
SODIUM SERPL-SCNC: 135 MMOL/L (ref 136–145)
WBC # BLD AUTO: 9.4 K/UL (ref 3.9–12.7)

## 2022-03-23 PROCEDURE — 99024 POSTOP FOLLOW-UP VISIT: CPT | Mod: ,,, | Performed by: PHYSICIAN ASSISTANT

## 2022-03-23 PROCEDURE — 36415 COLL VENOUS BLD VENIPUNCTURE: CPT | Performed by: STUDENT IN AN ORGANIZED HEALTH CARE EDUCATION/TRAINING PROGRAM

## 2022-03-23 PROCEDURE — 63600175 PHARM REV CODE 636 W HCPCS: Performed by: PHYSICIAN ASSISTANT

## 2022-03-23 PROCEDURE — 25000003 PHARM REV CODE 250: Performed by: PHYSICIAN ASSISTANT

## 2022-03-23 PROCEDURE — 85025 COMPLETE CBC W/AUTO DIFF WBC: CPT | Performed by: STUDENT IN AN ORGANIZED HEALTH CARE EDUCATION/TRAINING PROGRAM

## 2022-03-23 PROCEDURE — 99024 PR POST-OP FOLLOW-UP VISIT: ICD-10-PCS | Mod: ,,, | Performed by: PHYSICIAN ASSISTANT

## 2022-03-23 PROCEDURE — 80048 BASIC METABOLIC PNL TOTAL CA: CPT | Performed by: STUDENT IN AN ORGANIZED HEALTH CARE EDUCATION/TRAINING PROGRAM

## 2022-03-23 RX ORDER — ACETAMINOPHEN 325 MG/1
650 TABLET ORAL EVERY 6 HOURS PRN
Refills: 0
Start: 2022-03-23 | End: 2022-05-09

## 2022-03-23 RX ORDER — CEPHALEXIN 500 MG/1
500 CAPSULE ORAL EVERY 6 HOURS
Qty: 12 CAPSULE | Refills: 0
Start: 2022-03-23 | End: 2022-03-26

## 2022-03-23 RX ORDER — METHOCARBAMOL 500 MG/1
500 TABLET, FILM COATED ORAL 4 TIMES DAILY PRN
Start: 2022-03-23 | End: 2022-04-02

## 2022-03-23 RX ORDER — POLYETHYLENE GLYCOL 3350 17 G/17G
17 POWDER, FOR SOLUTION ORAL DAILY
Refills: 0
Start: 2022-03-23 | End: 2022-05-09

## 2022-03-23 RX ORDER — OXYCODONE HYDROCHLORIDE 5 MG/1
5 TABLET ORAL EVERY 6 HOURS PRN
Refills: 0
Start: 2022-03-23 | End: 2022-05-09

## 2022-03-23 RX ADMIN — CEPHALEXIN 500 MG: 250 CAPSULE ORAL at 05:03

## 2022-03-23 RX ADMIN — HEPARIN SODIUM 5000 UNITS: 5000 INJECTION INTRAVENOUS; SUBCUTANEOUS at 05:03

## 2022-03-23 RX ADMIN — HEPARIN SODIUM 5000 UNITS: 5000 INJECTION INTRAVENOUS; SUBCUTANEOUS at 03:03

## 2022-03-23 RX ADMIN — LACTULOSE 15 G: 20 SOLUTION ORAL at 09:03

## 2022-03-23 RX ADMIN — SODIUM CHLORIDE TAB 1 GM 2 G: 1 TAB at 03:03

## 2022-03-23 RX ADMIN — CEPHALEXIN 500 MG: 250 CAPSULE ORAL at 06:03

## 2022-03-23 RX ADMIN — CEPHALEXIN 500 MG: 250 CAPSULE ORAL at 11:03

## 2022-03-23 RX ADMIN — SENNOSIDES AND DOCUSATE SODIUM 1 TABLET: 50; 8.6 TABLET ORAL at 09:03

## 2022-03-23 NOTE — PLAN OF CARE
[facility]    FACILITY TRANSFER ORDERS      Patient Name: Vicente Greenwood  YOB: 1965    PCP: Cesar Herrera MD   PCP Address: [address] [place], [place] [address]TIERNEY CHRIS / DIDI [id number]  PCP Phone Number: [phone]  PCP Fax: [phone]    Encounter Date: 03/23/2022    Admit to: Rehab    Vital Signs:  Routine    Diagnoses:   Active Hospital Problems    Diagnosis  POA    *Cervical spondylosis with myelopathy and radiculopathy [M47.12, M47.22]  Yes    Impaired functional mobility and endurance [Z74.09]  Unknown    Elevated BP without diagnosis of hypertension [R03.0]  No    Tobacco dependence [F17.200]  Yes    Alcohol use [Z72.89]  Yes    Preoperative clearance [Z01.818]  Not Applicable      Resolved Hospital Problems   No resolved problems to display.       Allergies:Review of patient's allergies indicates:  No Known Allergies    Diet: regular diet    Activities: Activity as tolerated  Activity Restrictions:  [x]  Return to work will be determined on an individual basis.  [x]  No lifting greater than 5-10 pounds.  [x]  Avoid bending and twisting the area of your surgery more than 45 degrees from neutral position in any direction.  [x]  No driving or operating machinery:  [x]  until cleared by your surgeon.  [x]  while taking narcotic pain medications or muscle relaxants.  [x]  Wear your brace at all times except when lying in bed, showering, using the restroom, or performing hygiene tasks.   [x]  Increase ambulation over the next 2 weeks so that you are walking 2 miles per day at 2 weeks post-operatively.  [x]  Walk on paved surfaces only. It is okay to walk up and down stairs while holding onto a side rail.  [x]  No sexual activity for 6 weeks.    Nursing: Fall precautions     Labs: CBC and CMP per facility protocol    CONSULTS:    Physical Therapy to evaluate and treat.  and Occupational Therapy to evaluate and treat.    MISCELLANEOUS CARE:  N/A    WOUND CARE ORDERS  [x]  No bandage required.  Keep your incision open to the air. You have dermabond covering your incision. This will begin to flake off over the next 2 weeks. Do not remove this on your own, allow it to peel off. Do not apply ointments or creams to your incision  [x]  You may shower on the 2nd day after your surgery. Keep the incision clean and dry at all times. Do not allow the force of water to hit the incision. If the incision gets damp, pat it dry. Do not rub or scrub the incision.  [x]  You cannot take a bath until 8 weeks after surgery.    Medications: Review discharge medications with patient and family and provide education.      Facility provider to assess ongoing need for subcutaneous heparin for DVT prophylaxis.      Current Discharge Medication List      START taking these medications    Details   acetaminophen (TYLENOL) 325 MG tablet Take 2 tablets (650 mg total) by mouth every 6 (six) hours as needed for Pain.  Refills: 0      cephALEXin (KEFLEX) 500 MG capsule Take 1 capsule (500 mg total) by mouth every 6 (six) hours. for 3 days  Qty: 12 capsule, Refills: 0      methocarbamoL (ROBAXIN) 500 MG Tab Take 1 tablet (500 mg total) by mouth 4 (four) times daily as needed (muscle spasms).      oxyCODONE (ROXICODONE) 5 MG immediate release tablet Take 1 tablet (5 mg total) by mouth every 6 (six) hours as needed for Pain.  Refills: 0    Comments: Quantity prescribed more than 7 day supply? Yes, quantity medically necessary      polyethylene glycol (GLYCOLAX) 17 gram PwPk Take 17 g by mouth once daily.  Refills: 0                Immunizations Administered as of 3/23/2022     No immunizations on file.        Some patients may experience side effects after vaccination.  These may include fever, headache, muscle or joint aches.  Most symptoms resolve with 24-48 hours and do not require urgent medical evaluation unless they persist for more than 72 hours or symptoms are concerning for an unrelated medical condition.           _________________________________  Amanda Cherry PA-C  03/23/2022

## 2022-03-23 NOTE — PLAN OF CARE
Problem: Adult Inpatient Plan of Care  Goal: Plan of Care Review  Outcome: Ongoing, Progressing     Problem: Fall Injury Risk  Goal: Absence of Fall and Fall-Related Injury  Outcome: Ongoing, Progressing     Patient is AAO x4. POC reviewed with patient. Patient verbalized understanding. Patient's breathing is unlabored with equal chest expansion.  Patient has bilat lower extremity weakness. Patient denies any numbness and tingling. Patient has an anterrior neck incision w/ gauze and tegaderm in place. Patient did receive a muscle relaxer during shift. Patient voids per urinal or ambulates w/walker w/1 assist. Patient remained free from falls. Patient rested well through shift. Bed in lowest position,bed alarm on, side rails up x3, no complaints or signs of distress. WCTM.  See flowsheets for full assessment and VS info.

## 2022-03-23 NOTE — PLAN OF CARE
[facility] ([facility])  Discharge Final Note    Primary Care Provider: Cesar Herrera MD    Expected Discharge Date: 3/23/2022     Patient to be discharged to R.  Care deferred to [facility].  Wheelchair transportation to be provided per [facility].  Neurosurgery clinic to schedule follow up appointment.    Nurse to call report to [phone].  Wheelchair van requested for 4:00 pm which is not a guaranteed arrival time.    Future Appointments   Date Time Provider Department Center   4/14/2022  9:40 AM Ashly Parsons PA-C [facility]   5/9/2022  9:20 AM Cesar Herrera MD [facility]       Final Discharge Note (most recent)     Final Note - 03/23/22 1430        Final Note    Assessment Type Final Discharge Note     Anticipated Discharge Disposition Rehab Facility        Post-Acute Status    Post-Acute Authorization Placement     Post-Acute Placement Status Set-up Complete/Auth obtained     Discharge Delays None known at this time                 Important Message from Medicare             Contact Info     [facility] 8th Fl   Specialty: Neurosurgery    [address]  [place] [address]   Phone: [phone]       Next Steps: Follow up    Instructions: For wound check    Michelle Johnson MD   Specialty: Neurosurgery    [address] [facility] [address]   Phone: [phone]       Next Steps: Follow up in 6 week(s)    Instructions: Post-op follow up

## 2022-03-23 NOTE — PROGRESS NOTES
Saline lock and telemetry monitor were dc'd.Pt's mother present at bedside. Report was called to CHRIS Ramos at [facility]. Pt awake,alert,verbally responsive. No distress noted,breathing unlabored.Denies any pain or discomfort at this time.Waiting for wheelchair van to dc pt to facility. Will continue to monitor.

## 2022-03-23 NOTE — TELEPHONE ENCOUNTER
Informed Mr. Greenwood that his appt today with Dr. Quick has been cancelled as he is currently admitted in the hospital.  Encouraged him to contact the clinic once discharged so we can get him r/s.

## 2022-03-23 NOTE — DISCHARGE SUMMARY
[facility] ([facility])  Neurosurgery  Discharge Summary      Patient Name: Vicente Greenwood  MRN: [id number]  Admission Date: 3/18/2022  Hospital Length of Stay: 5 days  Discharge Date and Time: 3/23/2022  Attending Physician: Michelle Johnson MD   Discharging Provider: Amanda Cherry PA-C  Primary Care Provider: Cesar Herrera MD    HPI:   57 yo male with known history of lumbar spondylosis recently referred for DEBORAH on 3/8/22. Pt says following procedure, he had full resolution of related radicular symptoms but acutely developed signficant gait imbalance requiring a walker to ambulate. Pt does not complain subjectively of focal weakness, and says that since event symptoms have not worsened but intermittently improved and relapsed to current baseline. Pt was seen and assessed in clinic today and referred to ED for evaluation. No bowel or bladder symptoms. Pt has no other medical history and take no home medications.         Procedure(s) (LRB):  C3-C4 Anterior Cervical Discectomy and Fusion (N/A)     Hospital Course:   3/19: Admitted overnight to Neurosurgery service for worsening myelopathic symptoms.  3/20: NPO for OR; hospital medicine has optimized patient.    3/21: Patient reports great improvement in upper extremity strength. Ambulating well with therapy. Voiding spontaneously. Denies flatus - bowel regimen increased. F/u BM/flatus. Anterior neck drain with 40 cc outupt - removed. Patient tolerated procedure well. Start 5 days keflex. PT/OT recs for rehab.   3/22: Patient neurologically stable and strength improving. Denies new symptoms and states his pain is well controlled. Small BM today. Pending rehab acceptance.   3/23: NAEON. AFVSS.  Reports incisional pain well controlled. Denies throat pain or swallowing difficulties. Denies radiating leg pain. Reports ambulating with walker. Voiding spontaneously. Bowel movement s/p prune regimen. Sodium replaced. No weakness or numbness. Medically stable for  discharge to rehab. Incision care and activity recommendations reviewed. Plan of care discussed with patient and he voiced understanding. His questions were all answered. Follow-up in Neurosurgery clinic arranged.         Goals of Care Treatment Preferences:  Code Status: Full Code      Consults: PT/OT  Consults (From admission, onward)        Status Ordering Provider     Inpatient consult to Physical Medicine Rehab  Once        Provider:  (Not yet assigned)    Completed DENNY SPENCER     Inpatient consult to [facility] Medicine-General  Once        Provider:  (Not yet assigned)    Completed ANAMIKA CHEN     Inpatient consult to Social Work/Case Management  Once        Provider:  (Not yet assigned)    Acknowledged DENNY SPENCER     Inpatient consult to Neurosurgery  Once        Provider:  (Not yet assigned)    Completed SALLIE MATHUR          Significant Diagnostic Studies: Labs:   BMP:   Recent Labs   Lab 03/22/22  0722 03/23/22 0727   GLU 98 111*    135*   K 3.7 3.7    99   CO2 27 30*   BUN 9 9   CREATININE 0.7 0.7   CALCIUM 9.7 9.6   , CMP   Recent Labs   Lab 03/22/22  0722 03/23/22 0727    135*   K 3.7 3.7    99   CO2 27 30*   GLU 98 111*   BUN 9 9   CREATININE 0.7 0.7   CALCIUM 9.7 9.6   ANIONGAP 8 6*   ESTGFRAFRICA >60.0 >60.0   EGFRNONAA >60.0 >60.0   , CBC   Recent Labs   Lab 03/22/22  0722 03/23/22 0727   WBC 13.14* 9.40   HGB 13.7* 13.7*   HCT 42.1 41.6    374    and INR   Lab Results   Component Value Date    INR 1.1 03/20/2022    INR 1.0 03/18/2022     Radiology: Cervical spine xray, c spine MRI  General: Well developed, well nourished, not in acute distress.   Head: Normocephalic, atraumatic.  Neck: Full ROM.   Neurologic: Alert and oriented x 4. Thought content appropriate.  GCS: Motor:6  Verbal:5  Eyes:4   GCS Total: 15  Language: No aphasia.  Speech: No dysarthria.  Cranial nerves: Face symmetric, tongue midline, CN II-XII grossly intact.   Eyes: Pupils equal,  round, reactive to light with accomodation, EOMI.   Pulmonary: Normal respirations, no signs of respiratory distress.  Abdomen: Soft, non-distended, non-tender to palpation.  Sensory: Intact to light touch throughout.  Motor Strength: Moves all extremities spontaneously with good tone. Full strength upper and lower extremities. No abnormal movements seen.      Strength   Deltoids Triceps Biceps Wrist Extension Wrist Flexion Hand    Upper: R 5/5 5/5 5/5 5/5 5/5 5/5     L 5/5 5/5 5/5 5/5 5/5 5/5       Hip Flexion Knee Extension Knee  Flexion Dorsiflexion Plantar flexion EHL   Lower: R 5/5 5/5 5/5 5/5 5/5 5/5     L 5/5 5/5 5/5 5/5 5/5 5/5      Vascular: No LE edema.   Skin: Skin is warm, dry and intact.    Incision: c/d/i with skin edges well approximated with dermabond. No surrounding erythema or edema. No drainage from incision. No palpable hematoma or underlying fluid collection.      Pending Diagnostic Studies:     Procedure Component Value Units Date/Time    EKG 12-lead [[id number]]     Order Status: Sent Lab Status: No result         Final Active Diagnoses:    Diagnosis Date Noted POA    PRINCIPAL PROBLEM:  Cervical spondylosis with myelopathy and radiculopathy [M47.12, M47.22] 03/19/2022 Yes    Impaired functional mobility and endurance [Z74.09] 03/22/2022 Unknown    Elevated BP without diagnosis of hypertension [R03.0] 03/21/2022 No    Tobacco dependence [F17.200] 03/19/2022 Yes    Alcohol use [Z72.89] 03/19/2022 Yes    Preoperative clearance [Z01.818] 03/19/2022 Not Applicable      Problems Resolved During this Admission:      Discharged Condition: good     Disposition: Rehab Facility    Follow Up:   Follow-up Information     [facility] 8th Fl Follow up.    Specialty: Neurosurgery  Why: For wound check  Contact information:  [address] [facility]  [facility] [address] [phone]  Additional information:  8th Floor Clinic [place]   Please park in [facility].   Check in desk is  located in the lobby. Please take the C elevator to 8th floor which opens to the lobby.           Michelle Johnson MD Follow up in 6 week(s).    Specialty: Neurosurgery  Why: Post-op follow up  Contact information:  [address]  [facility] [address]  [phone]                       Patient Instructions:      X-Ray Cervical Spine AP And Lateral   Standing Status: Future Standing Exp. Date: 03/21/23     Order Specific Question Answer Comments   Reason for Exam: 6 week post op follow up s/p ACDF    May the Radiologist modify the order per protocol to meet the clinical needs of the patient? Yes    Release to patient Immediate      Notify your health care provider if you experience any of the following:  increased confusion or weakness     Notify your health care provider if you experience any of the following:  persistent dizziness, light-headedness, or visual disturbances     Notify your health care provider if you experience any of the following:  worsening rash     Notify your health care provider if you experience any of the following:  severe persistent headache     Notify your health care provider if you experience any of the following:  difficulty breathing or increased cough     Notify your health care provider if you experience any of the following:  redness, tenderness, or signs of infection (pain, swelling, redness, odor or green/yellow discharge around incision site)     Notify your health care provider if you experience any of the following:  severe uncontrolled pain     Notify your health care provider if you experience any of the following:  persistent nausea and vomiting or diarrhea     Notify your health care provider if you experience any of the following:  temperature >100.4     Activity as tolerated     Medications:  Reconciled Home Medications:      Medication List      START taking these medications    acetaminophen 325 MG tablet  Commonly known as: TYLENOL  Take 2 tablets (650 mg total) by  mouth every 6 (six) hours as needed for Pain.     cephALEXin 500 MG capsule  Commonly known as: KEFLEX  Take 1 capsule (500 mg total) by mouth every 6 (six) hours. for 3 days     methocarbamoL 500 MG Tab  Commonly known as: ROBAXIN  Take 1 tablet (500 mg total) by mouth 4 (four) times daily as needed (muscle spasms).     oxyCODONE 5 MG immediate release tablet  Commonly known as: ROXICODONE  Take 1 tablet (5 mg total) by mouth every 6 (six) hours as needed for Pain.     polyethylene glycol 17 gram Pwpk  Commonly known as: GLYCOLAX  Take 17 g by mouth once daily.            Amanda Cherry PA-C  Neurosurgery  [facility] - [facility] [place])

## 2022-03-23 NOTE — TELEPHONE ENCOUNTER
Called patient to discuss today's appointment regarding the efficacy of his PT but, patient has not been to PT. Patient is admitted for recovery from a surgery he had on Sunday, 3/20/22. I canceled today's appointment and told his mother that if/when he needs us he can call and we will get him r/s. Mother verbalized understanding and nothing further discussed.
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
## 2022-04-01 ENCOUNTER — TELEPHONE (OUTPATIENT)
Dept: NEUROSURGERY | Facility: CLINIC | Age: 57
End: 2022-04-01
Payer: COMMERCIAL

## 2022-04-01 NOTE — TELEPHONE ENCOUNTER
Spoke with pt and informed him that I have sent his disability paperwork back to his job. The pt stated he has not received a call back saying it had not been received therefore he assumes all is well. I VU   ----- Message from Mary Carmen Nguyen MA sent at 3/31/2022  2:41 PM CDT -----  Contact: Patient    ----- Message -----  From: Nelli Nam  Sent: 3/31/2022   2:27 PM CDT  To: Jaqueline Limon Staff    Patient call in regarding an emergency surgery that was perform on 03/20/2022    Patient requesting documentation for fax to job to file short term disability     Fax number [phone] patient stated not aware of area code     Patient stated has not to receive a call today from doctor office     Patient stated receive a bill today and has no money coming in     Please assist     Patient can be reach at [phone]

## 2022-04-04 ENCOUNTER — CLINICAL SUPPORT (OUTPATIENT)
Dept: NEUROSURGERY | Facility: CLINIC | Age: 57
End: 2022-04-04
Payer: COMMERCIAL

## 2022-04-04 NOTE — PROGRESS NOTES
Wound Check   Neurosurgery      Mr Abram Delong is a very pleasant 56 year old male who underwent a 1-level ACDF on 3/20/22 with Dr. Johnson. (For complete diagnosis and procedure, see OP note.) He presents to clinic today via w/c accompanied by a rehab assistant for his 2 week post-op wound check.     Pt appears well, in NAD, wearing collar. Collar appears well-fitting, worn properly. Patient denies fevers, chills, night sweats or N/V. Reports that his preoperative symptoms of weakness are much better, states, I have no pain, I am getting stronger, I am feeling like myself again. Denies new symptoms. He is participating in rehab and is expecting D/C home with  Wednesday. States he feels like he is ready for that.    Patient denies pain and has not taken any pain meds since the Tuesday after surgery. States his pain is a 0.    Pt has some sore throat and swallowing problems for about 5 days after surgery but they have completely resolved and he feels his swallowing is normal.    Neck incision appears clean, dry and intact with no signs of erythema, swelling or purulent drainage. Upon palpation, incision line is dense with no underlying fluctuance or temperature difference.       Reviewed Post-op appts and updated instructions. All questions answered.    He was given written instructions that include:     Shower normally, pat the incisions dry and keep open to air.    Use of the collar and its rationale were reinforced.    See Dr Johnson in 4 weeks with X-Rays.    Call with any questions or concerns.    Luz Elena Agarwal RN  Neurosurgery

## 2022-04-06 ENCOUNTER — TELEPHONE (OUTPATIENT)
Dept: FAMILY MEDICINE | Facility: CLINIC | Age: 57
End: 2022-04-06
Payer: COMMERCIAL

## 2022-04-06 NOTE — TELEPHONE ENCOUNTER
----- Message from Nivia Ontiveros sent at 4/6/2022  2:47 PM CDT -----  Regarding: [facility] [phone]  Type: Patient Call Back    Who called: Autumn with [facility] ([place], [place]) health    What is the request in detail: Received home health orders from the hospital and they wanted to confirm that he will follow the home health services and will sign the orders.     Can the clinic reply by [facility]? no    Would the patient rather a call back or a response via [facility]?  call back    Best call back number: [phone]

## 2022-04-07 PROCEDURE — G0180 MD CERTIFICATION HHA PATIENT: HCPCS | Mod: ,,, | Performed by: NEUROLOGICAL SURGERY

## 2022-04-07 PROCEDURE — G0180 PR HOME HEALTH MD CERTIFICATION: ICD-10-PCS | Mod: ,,, | Performed by: NEUROLOGICAL SURGERY

## 2022-04-07 NOTE — TELEPHONE ENCOUNTER
Below information given to  Autumn with [facility] home care verbalized   understanding

## 2022-04-08 ENCOUNTER — TELEPHONE (OUTPATIENT)
Dept: FAMILY MEDICINE | Facility: CLINIC | Age: 57
End: 2022-04-08
Payer: COMMERCIAL

## 2022-04-08 NOTE — TELEPHONE ENCOUNTER
----- Message from Sarabjit Stiles MA sent at 4/8/2022  1:06 PM CDT -----  Type: Patient Call Back    Who called:self    What is the request in detail:pt. Has been discharged from rehab and is asking for a order for out pt. Therapy..     Can the clinic reply by [facility]?no    Would the patient rather a call back or a response via [facility]?yes    Best call back number:[phone] (home)

## 2022-04-08 NOTE — TELEPHONE ENCOUNTER
Pt is requesting a referral for OT his therapist feels that he is strong enough now for outpatient therapy.

## 2022-04-08 NOTE — TELEPHONE ENCOUNTER
What is the OT for?  He may need that order from his surgeon in the event there are specific exercises needed for recovery    Thanks  Dr. Herrera

## 2022-04-12 ENCOUNTER — TELEPHONE (OUTPATIENT)
Dept: NEUROSURGERY | Facility: CLINIC | Age: 57
End: 2022-04-12
Payer: COMMERCIAL

## 2022-04-12 NOTE — TELEPHONE ENCOUNTER
Pt came to the clinic to  [facility] paperwork   ----- Message from Greg Shelby MA sent at 4/12/2022  1:29 PM CDT -----    ----- Message -----  From: Chancellor Mcgill  Sent: 4/12/2022  11:14 AM CDT  To: [facility] Neurosurgery Clinical Support    Type:  Needs Medical Advice    Who Called: Pt   Would the patient rather a call back or a response via [facility]? Callback   Best Call Back Number: [phone]  Additional Information: Pt requesting callback to speak with with someone in [facility]ers office. Would also like to get some documentation for his job.

## 2022-04-25 ENCOUNTER — TELEPHONE (OUTPATIENT)
Dept: NEUROSURGERY | Facility: CLINIC | Age: 57
End: 2022-04-25
Payer: COMMERCIAL

## 2022-04-25 DIAGNOSIS — M47.12 CERVICAL SPONDYLOSIS WITH MYELOPATHY AND RADICULOPATHY: Primary | ICD-10-CM

## 2022-04-25 DIAGNOSIS — M47.22 CERVICAL SPONDYLOSIS WITH MYELOPATHY AND RADICULOPATHY: Primary | ICD-10-CM

## 2022-04-25 NOTE — TELEPHONE ENCOUNTER
I returned the pt call where he requested more physical therapy.  New orders have been placed  ----- Message from Rosangela Bauer sent at 4/25/2022  3:09 PM CDT -----  Contact: pt  Pt requesting call back re: order for more OT.    Confirmed contact below:  Contact Name:Vicente Greenwood  Phone Number: [phone]

## 2022-04-27 ENCOUNTER — TELEPHONE (OUTPATIENT)
Dept: NEUROSURGERY | Facility: CLINIC | Age: 57
End: 2022-04-27
Payer: COMMERCIAL

## 2022-04-27 NOTE — TELEPHONE ENCOUNTER
I returned Autumn's call however it was oncall services and [facility] would not be available until 8am. I VU and will call back  ----- Message from Dayanara Delcid sent at 4/26/2022 11:17 AM CDT -----  Contact: Autumn with Home Health  [phone]  GENERAL CALL BACK    Autumn with Home Health called to confirm that Dr. Johnson will sign off on patient's Home Health orders since she performed patient's surgery and post op.     Contact Preference:  Phone call [phone]

## 2022-04-28 ENCOUNTER — DOCUMENT SCAN (OUTPATIENT)
Dept: HOME HEALTH SERVICES | Facility: HOSPITAL | Age: 57
End: 2022-04-28
Payer: COMMERCIAL

## 2022-05-02 ENCOUNTER — HOSPITAL ENCOUNTER (OUTPATIENT)
Dept: RADIOLOGY | Facility: HOSPITAL | Age: 57
Discharge: HOME | End: 2022-05-02
Attending: PHYSICIAN ASSISTANT
Payer: COMMERCIAL

## 2022-05-02 ENCOUNTER — OFFICE VISIT (OUTPATIENT)
Dept: NEUROSURGERY | Facility: CLINIC | Age: 57
End: 2022-05-02
Payer: COMMERCIAL

## 2022-05-02 ENCOUNTER — TELEPHONE (OUTPATIENT)
Dept: NEUROSURGERY | Facility: CLINIC | Age: 57
End: 2022-05-02
Payer: COMMERCIAL

## 2022-05-02 VITALS
DIASTOLIC BLOOD PRESSURE: 67 MMHG | WEIGHT: 181 LBS | HEART RATE: 68 BPM | OXYGEN SATURATION: 98 % | SYSTOLIC BLOOD PRESSURE: 104 MMHG | HEIGHT: 64 IN | BODY MASS INDEX: 30.9 KG/M2

## 2022-05-02 DIAGNOSIS — G95.9 MYELOPATHY: ICD-10-CM

## 2022-05-02 DIAGNOSIS — Z98.1 ARTHRODESIS STATUS: Primary | ICD-10-CM

## 2022-05-02 DIAGNOSIS — M47.12 CERVICAL SPONDYLOSIS WITH MYELOPATHY AND RADICULOPATHY: Primary | ICD-10-CM

## 2022-05-02 DIAGNOSIS — M47.22 CERVICAL SPONDYLOSIS WITH MYELOPATHY AND RADICULOPATHY: Primary | ICD-10-CM

## 2022-05-02 PROCEDURE — 3078F DIAST BP <80 MM HG: CPT | Mod: CPTII,S$GLB,, | Performed by: PHYSICIAN ASSISTANT

## 2022-05-02 PROCEDURE — 1159F PR MEDICATION LIST DOCUMENTED IN MEDICAL RECORD: ICD-10-PCS | Mod: CPTII,S$GLB,, | Performed by: PHYSICIAN ASSISTANT

## 2022-05-02 PROCEDURE — 72040 XR CERVICAL SPINE AP LATERAL: ICD-10-PCS | Mod: 26,,, | Performed by: RADIOLOGY

## 2022-05-02 PROCEDURE — 99999 PR PBB SHADOW E&M-EST. PATIENT-LVL III: ICD-10-PCS | Mod: PBBFAC,,, | Performed by: PHYSICIAN ASSISTANT

## 2022-05-02 PROCEDURE — 3074F SYST BP LT 130 MM HG: CPT | Mod: CPTII,S$GLB,, | Performed by: PHYSICIAN ASSISTANT

## 2022-05-02 PROCEDURE — 3074F PR MOST RECENT SYSTOLIC BLOOD PRESSURE < 130 MM HG: ICD-10-PCS | Mod: CPTII,S$GLB,, | Performed by: PHYSICIAN ASSISTANT

## 2022-05-02 PROCEDURE — 3008F BODY MASS INDEX DOCD: CPT | Mod: CPTII,S$GLB,, | Performed by: PHYSICIAN ASSISTANT

## 2022-05-02 PROCEDURE — 1159F MED LIST DOCD IN RCRD: CPT | Mod: CPTII,S$GLB,, | Performed by: PHYSICIAN ASSISTANT

## 2022-05-02 PROCEDURE — 99024 POSTOP FOLLOW-UP VISIT: CPT | Mod: S$GLB,,, | Performed by: PHYSICIAN ASSISTANT

## 2022-05-02 PROCEDURE — 72040 X-RAY EXAM NECK SPINE 2-3 VW: CPT | Mod: TC

## 2022-05-02 PROCEDURE — 99999 PR PBB SHADOW E&M-EST. PATIENT-LVL III: CPT | Mod: PBBFAC,,, | Performed by: PHYSICIAN ASSISTANT

## 2022-05-02 PROCEDURE — 3008F PR BODY MASS INDEX (BMI) DOCUMENTED: ICD-10-PCS | Mod: CPTII,S$GLB,, | Performed by: PHYSICIAN ASSISTANT

## 2022-05-02 PROCEDURE — 99024 PR POST-OP FOLLOW-UP VISIT: ICD-10-PCS | Mod: S$GLB,,, | Performed by: PHYSICIAN ASSISTANT

## 2022-05-02 PROCEDURE — 3078F PR MOST RECENT DIASTOLIC BLOOD PRESSURE < 80 MM HG: ICD-10-PCS | Mod: CPTII,S$GLB,, | Performed by: PHYSICIAN ASSISTANT

## 2022-05-02 PROCEDURE — 72040 X-RAY EXAM NECK SPINE 2-3 VW: CPT | Mod: 26,,, | Performed by: RADIOLOGY
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
## 2022-05-03 ENCOUNTER — DOCUMENT SCAN (OUTPATIENT)
Dept: HOME HEALTH SERVICES | Facility: HOSPITAL | Age: 57
End: 2022-05-03
Payer: COMMERCIAL

## 2022-05-06 ENCOUNTER — TELEPHONE (OUTPATIENT)
Dept: FAMILY MEDICINE | Facility: CLINIC | Age: 57
End: 2022-05-06
Payer: COMMERCIAL

## 2022-05-09 ENCOUNTER — OFFICE VISIT (OUTPATIENT)
Dept: FAMILY MEDICINE | Facility: CLINIC | Age: 57
End: 2022-05-09
Payer: COMMERCIAL

## 2022-05-09 VITALS
BODY MASS INDEX: 33.09 KG/M2 | OXYGEN SATURATION: 95 % | HEART RATE: 75 BPM | HEIGHT: 64 IN | DIASTOLIC BLOOD PRESSURE: 62 MMHG | TEMPERATURE: 99 F | SYSTOLIC BLOOD PRESSURE: 104 MMHG | WEIGHT: 193.81 LBS

## 2022-05-09 DIAGNOSIS — R60.0 BILATERAL LEG EDEMA: Primary | ICD-10-CM

## 2022-05-09 PROCEDURE — 99999 PR PBB SHADOW E&M-EST. PATIENT-LVL III: CPT | Mod: PBBFAC,,, | Performed by: FAMILY MEDICINE

## 2022-05-09 PROCEDURE — 3008F BODY MASS INDEX DOCD: CPT | Mod: CPTII,S$GLB,, | Performed by: FAMILY MEDICINE

## 2022-05-09 PROCEDURE — 3074F SYST BP LT 130 MM HG: CPT | Mod: CPTII,S$GLB,, | Performed by: FAMILY MEDICINE

## 2022-05-09 PROCEDURE — 1160F RVW MEDS BY RX/DR IN RCRD: CPT | Mod: CPTII,S$GLB,, | Performed by: FAMILY MEDICINE

## 2022-05-09 PROCEDURE — 99214 OFFICE O/P EST MOD 30 MIN: CPT | Mod: S$GLB,,, | Performed by: FAMILY MEDICINE

## 2022-05-09 PROCEDURE — 99214 PR OFFICE/OUTPT VISIT, EST, LEVL IV, 30-39 MIN: ICD-10-PCS | Mod: S$GLB,,, | Performed by: FAMILY MEDICINE

## 2022-05-09 PROCEDURE — 3078F PR MOST RECENT DIASTOLIC BLOOD PRESSURE < 80 MM HG: ICD-10-PCS | Mod: CPTII,S$GLB,, | Performed by: FAMILY MEDICINE

## 2022-05-09 PROCEDURE — 3074F PR MOST RECENT SYSTOLIC BLOOD PRESSURE < 130 MM HG: ICD-10-PCS | Mod: CPTII,S$GLB,, | Performed by: FAMILY MEDICINE

## 2022-05-09 PROCEDURE — 99999 PR PBB SHADOW E&M-EST. PATIENT-LVL III: ICD-10-PCS | Mod: PBBFAC,,, | Performed by: FAMILY MEDICINE

## 2022-05-09 PROCEDURE — 1159F PR MEDICATION LIST DOCUMENTED IN MEDICAL RECORD: ICD-10-PCS | Mod: CPTII,S$GLB,, | Performed by: FAMILY MEDICINE

## 2022-05-09 PROCEDURE — 3008F PR BODY MASS INDEX (BMI) DOCUMENTED: ICD-10-PCS | Mod: CPTII,S$GLB,, | Performed by: FAMILY MEDICINE

## 2022-05-09 PROCEDURE — 1159F MED LIST DOCD IN RCRD: CPT | Mod: CPTII,S$GLB,, | Performed by: FAMILY MEDICINE

## 2022-05-09 PROCEDURE — 3078F DIAST BP <80 MM HG: CPT | Mod: CPTII,S$GLB,, | Performed by: FAMILY MEDICINE

## 2022-05-09 PROCEDURE — 1160F PR REVIEW ALL MEDS BY PRESCRIBER/CLIN PHARMACIST DOCUMENTED: ICD-10-PCS | Mod: CPTII,S$GLB,, | Performed by: FAMILY MEDICINE
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
## 2022-05-10 ENCOUNTER — LAB VISIT (OUTPATIENT)
Dept: LAB | Facility: HOSPITAL | Age: 57
End: 2022-05-10
Attending: FAMILY MEDICINE
Payer: COMMERCIAL

## 2022-05-10 ENCOUNTER — DOCUMENT SCAN (OUTPATIENT)
Dept: HOME HEALTH SERVICES | Facility: HOSPITAL | Age: 57
End: 2022-05-10
Payer: COMMERCIAL

## 2022-05-10 DIAGNOSIS — R60.0 BILATERAL LEG EDEMA: ICD-10-CM

## 2022-05-10 LAB
ALBUMIN SERPL BCP-MCNC: 3.9 G/DL (ref 3.5–5.2)
ALP SERPL-CCNC: 48 U/L (ref 55–135)
ALT SERPL W/O P-5'-P-CCNC: 25 U/L (ref 10–44)
ANION GAP SERPL CALC-SCNC: 10 MMOL/L (ref 8–16)
AST SERPL-CCNC: 27 U/L (ref 10–40)
BASOPHILS # BLD AUTO: 0.05 K/UL (ref 0–0.2)
BASOPHILS NFR BLD: 0.6 % (ref 0–1.9)
BILIRUB SERPL-MCNC: 0.4 MG/DL (ref 0.1–1)
BNP SERPL-MCNC: <10 PG/ML (ref 0–99)
BUN SERPL-MCNC: 11 MG/DL (ref 6–20)
CALCIUM SERPL-MCNC: 9.7 MG/DL (ref 8.7–10.5)
CHLORIDE SERPL-SCNC: 100 MMOL/L (ref 95–110)
CO2 SERPL-SCNC: 27 MMOL/L (ref 23–29)
CREAT SERPL-MCNC: 0.9 MG/DL (ref 0.5–1.4)
DIFFERENTIAL METHOD: ABNORMAL
EOSINOPHIL # BLD AUTO: 0.2 K/UL (ref 0–0.5)
EOSINOPHIL NFR BLD: 2.8 % (ref 0–8)
ERYTHROCYTE [DISTWIDTH] IN BLOOD BY AUTOMATED COUNT: 14.8 % (ref 11.5–14.5)
EST. GFR  (AFRICAN AMERICAN): >60 ML/MIN/1.73 M^2
EST. GFR  (NON AFRICAN AMERICAN): >60 ML/MIN/1.73 M^2
GLUCOSE SERPL-MCNC: 103 MG/DL (ref 70–110)
HCT VFR BLD AUTO: 41.1 % (ref 40–54)
HGB BLD-MCNC: 13.4 G/DL (ref 14–18)
IMM GRANULOCYTES # BLD AUTO: 0.02 K/UL (ref 0–0.04)
IMM GRANULOCYTES NFR BLD AUTO: 0.3 % (ref 0–0.5)
LYMPHOCYTES # BLD AUTO: 3.6 K/UL (ref 1–4.8)
LYMPHOCYTES NFR BLD: 45.1 % (ref 18–48)
MCH RBC QN AUTO: 28.1 PG (ref 27–31)
MCHC RBC AUTO-ENTMCNC: 32.6 G/DL (ref 32–36)
MCV RBC AUTO: 86 FL (ref 82–98)
MONOCYTES # BLD AUTO: 0.8 K/UL (ref 0.3–1)
MONOCYTES NFR BLD: 9.8 % (ref 4–15)
NEUTROPHILS # BLD AUTO: 3.3 K/UL (ref 1.8–7.7)
NEUTROPHILS NFR BLD: 41.4 % (ref 38–73)
NRBC BLD-RTO: 0 /100 WBC
PLATELET # BLD AUTO: 330 K/UL (ref 150–450)
PMV BLD AUTO: 9.6 FL (ref 9.2–12.9)
POTASSIUM SERPL-SCNC: 3.9 MMOL/L (ref 3.5–5.1)
PROT SERPL-MCNC: 7.1 G/DL (ref 6–8.4)
RBC # BLD AUTO: 4.77 M/UL (ref 4.6–6.2)
SODIUM SERPL-SCNC: 137 MMOL/L (ref 136–145)
WBC # BLD AUTO: 7.98 K/UL (ref 3.9–12.7)

## 2022-05-10 PROCEDURE — 80053 COMPREHEN METABOLIC PANEL: CPT | Performed by: FAMILY MEDICINE

## 2022-05-10 PROCEDURE — 36415 COLL VENOUS BLD VENIPUNCTURE: CPT | Mod: PO | Performed by: FAMILY MEDICINE

## 2022-05-10 PROCEDURE — 83880 ASSAY OF NATRIURETIC PEPTIDE: CPT | Performed by: FAMILY MEDICINE

## 2022-05-10 PROCEDURE — 85025 COMPLETE CBC W/AUTO DIFF WBC: CPT | Performed by: FAMILY MEDICINE

## 2022-05-16 ENCOUNTER — CLINICAL SUPPORT (OUTPATIENT)
Dept: REHABILITATION | Facility: HOSPITAL | Age: 57
End: 2022-05-16
Attending: NEUROLOGICAL SURGERY
Payer: COMMERCIAL

## 2022-05-16 DIAGNOSIS — M47.12 CERVICAL SPONDYLOSIS WITH MYELOPATHY AND RADICULOPATHY: ICD-10-CM

## 2022-05-16 DIAGNOSIS — Z74.09 IMPAIRED FUNCTIONAL MOBILITY AND ENDURANCE: Primary | ICD-10-CM

## 2022-05-16 DIAGNOSIS — R29.898 DECREASED STRENGTH OF LOWER EXTREMITY: ICD-10-CM

## 2022-05-16 DIAGNOSIS — M47.22 CERVICAL SPONDYLOSIS WITH MYELOPATHY AND RADICULOPATHY: ICD-10-CM

## 2022-05-16 PROBLEM — R26.2 DIFFICULTY WALKING: Status: RESOLVED | Noted: 2022-05-16 | Resolved: 2022-05-16

## 2022-05-16 PROBLEM — R26.2 DIFFICULTY WALKING: Status: ACTIVE | Noted: 2022-05-16

## 2022-05-16 PROCEDURE — 97162 PT EVAL MOD COMPLEX 30 MIN: CPT | Mod: PN

## 2022-05-16 PROCEDURE — 97110 THERAPEUTIC EXERCISES: CPT | Mod: PN
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
## 2022-05-16 NOTE — PROGRESS NOTES
"  Please See Full Physical Therapy Evaluation in Plan of Care                                                        Physical Therapy Initial Evaluation     Name: Vicente Greenwood  Clinic Number: [id number]    Therapy Diagnosis:   Encounter Diagnoses   Name Primary?    Cervical spondylosis with myelopathy and radiculopathy     Decreased strength of lower extremity     Impaired functional mobility and endurance Yes     Physician: Michelle Johnson MD    Physician Orders: PT Eval and Treat   Medical Diagnosis from Referral: Cervical spondylosis with myelopathy and radiculopathy  Evaluation Date: 5/16/2022  Authorization Period Expiration: 7/1/22  Plan of Care Expiration: 8/16/22  Visit # / Visits authorized: 1/ pending    Time In: 2:30pm  Time Out: 3:30pm  Total Billable Time: 60 minutes    Precautions:  s/p C3-4 ACDF on 3/20/22 POW 8 Dr. Johnson    Functional Limitations Reports - G Codes  Category: Mobility  Tool: FOTO Neck Survey  Score: 43% Limitation    TREATMENT     Treatment Time In: 3:00pm  Treatment Time Out: 3:30pm  Total Treatment time separate from Evaluation: 30 minutes    Vicente received therapeutic exercises to develop strength, endurance, ROM, flexibility, posture and core stabilization for 30 minutes including:    HS Str c/ strap 2x30" ea LE  Bridging 10x  SLR 10x ea   Sit to Stand 5x    Home Exercises and Patient Education Provided    Education provided:     Written Home Exercises Provided: yes.  Exercises were reviewed and Vicente was able to demonstrate them prior to the end of the session.  Vicente demonstrated good  understanding of the education provided.     See EMR under Patient Instructions for exercises provided 5/16/2022.    Assessment     Pt's spiritual, cultural and educational needs considered and pt agreeable to plan of care and goals as stated below:     Short Term GOALS: 6 weeks. Pt agrees with goals set.  1. Patient demonstrates independence with HEP.   2. Patient demonstrates " independence with Postural Awareness.   3. Patient demonstrates independence with body mechanics.   4. Patient will report pain of 4/10 at worst, on 0-10 pain scale, with all activity  5. Patient to complete 8 reps or greater on 30 Second Sit to Stand Test, from standard chair height  6. Patient demonstrates increased strength BLE's to 4/5 or greater to improve tolerance to functional activities pain free.     Long Term GOALS: 12 weeks. Pt agrees with goals set.  1. Patient demonstrates ability to walk 2 blocks, indep without AD, no major LOB  2. Patient demonstrates increased strength BLE's to 4+/5 or greater to improve tolerance to functional activities pain free.   3. Patient demonstrates improved overall function per FOTO Neck Survey to 40% Limitation or less.   4. Patient will report pain of 2/10 at worst, on 0-10 pain scale, with all activity  5. Patient demonstrates ability to complete Timed Up and Go (TUG) in 12 seconds or less, no AD, no major LOB    PLAN     Plan of care Certification: 5/16/2022 to 8/16/22.    Outpatient Physical Therapy 2 times weekly for 12 weeks to include the following interventions: Electrical Stimulation IFC/NMES, Gait Training, Manual Therapy, Moist Heat/ Ice, Neuromuscular Re-ed, Patient Education, Self Care, Therapeutic Activities, Therapeutic Exercise, Ultrasound and Dry Needling.  Pt may be seen by PTA as part of the rehabilitation team.     Kumar Wilson, PT  5/16/2022    I have seen the patient, reviewed the therapist's plan of care, and I agree with the plan of care.      I certify the need for these services furnished under this plan of treatment and while under my care.     ___________________ ________ Physician/Referring Practitioner            ___________________________ Date of Signature

## 2022-05-19 ENCOUNTER — CLINICAL SUPPORT (OUTPATIENT)
Dept: REHABILITATION | Facility: HOSPITAL | Age: 57
End: 2022-05-19
Payer: COMMERCIAL

## 2022-05-19 DIAGNOSIS — Z74.09 IMPAIRED FUNCTIONAL MOBILITY AND ENDURANCE: Primary | ICD-10-CM

## 2022-05-19 DIAGNOSIS — R29.898 DECREASED STRENGTH OF LOWER EXTREMITY: ICD-10-CM

## 2022-05-19 PROCEDURE — 97110 THERAPEUTIC EXERCISES: CPT | Mod: PN

## 2022-05-19 NOTE — PROGRESS NOTES
"  Physical Therapy Daily Treatment Note     Name: Vicente Greenwood  Clinic Number: [id number]    Therapy Diagnosis:   Encounter Diagnoses   Name Primary?    Impaired functional mobility and endurance Yes    Decreased strength of lower extremity      Physician: Michelle Johnson MD  Visit Date: 5/19/2022    Physician Orders: PT Eval and Treat   Medical Diagnosis from Referral: Cervical spondylosis with myelopathy and radiculopathy  Evaluation Date: 5/19/2022  Authorization Period Expiration: 7/1/22  Plan of Care Expiration: 8/16/22  Visit # / Visits authorized: 1/ 20 (+ eval)  PN Due: 6/16/22    Time In: 2:30pm  Time Out: 3:30pm  Total Billable Time: 60 minutes    Precautions:  s/p C3-4 ACDF on 3/20/22 POW 8 Dr. Johnson    Subjective     Pt reports: continues to have swelling in B feet.  He was compliant with home exercise program.  Response to previous treatment: none to report yet  Functional change: none to report yet    Pain: 3/10  Location: R posterior LE    Objective     Vicente received therapeutic exercises to develop strength, endurance, ROM, flexibility, posture and core stabilization for 45 minutes including:    Nustep 8 mins  HS Str c/ strap 3x30" ea LE  Piriformis Str 3x30" ea LE  Hip Add c/ ball 3x10  Hip Abd GTB 3x10  SAQ 2# 3x10 ea  Bridging 3x10  SLR 2x10 ea   Sit to Stand 3x10    Vicente received the following manual therapy techniques: Joint mobilizations and Soft tissue Mobilization were applied to the: B LEs for 0 minutes, including:    Vicente received hot pack for 0 minutes.  Vicente received cold pack for 0 minutes.    Home Exercises Provided and Patient Education Provided     Education provided:   Written Home Exercises Provided: yes.  Exercises were reviewed and Vicente was able to demonstrate them prior to the end of the session.  Vicente demonstrated good  understanding of the education provided.     See EMR under Patient Instructions for exercises provided prior visit.    Assessment     Pt " tolerated treatment session well, with no significant pain or difficulty noted during therex program. Continues to present in open-toe sandals and increased edema in B feet. Sit to stands performed well with no LOB and good erect trunk positioning    Vicente Is progressing well towards his goals.   Pt prognosis is Good.     Pt will continue to benefit from skilled outpatient physical therapy to address the deficits listed in the problem list box on initial evaluation, provide pt/family education and to maximize pt's level of independence in the home and community environment.     Pt's spiritual, cultural and educational needs considered and pt agreeable to plan of care and goals.     Anticipated barriers to physical therapy: COVID-19 Concerns    Goals:   Short Term GOALS: 6 weeks. Pt agrees with goals set.  1. Patient demonstrates independence with HEP. In progress  2. Patient demonstrates independence with Postural Awareness. In progress  3. Patient demonstrates independence with body mechanics. In progress  4. Patient will report pain of 4/10 at worst, on 0-10 pain scale, with all activity In progress  5. Patient to complete 8 reps or greater on 30 Second Sit to Stand Test, from standard chair height In progress  6. Patient demonstrates increased strength BLE's to 4/5 or greater to improve tolerance to functional activities pain free.  In progress    Long Term GOALS: 12 weeks. Pt agrees with goals set. In progress  1. Patient demonstrates ability to walk 2 blocks, indep without AD, no major LOB  2. Patient demonstrates increased strength BLE's to 4+/5 or greater to improve tolerance to functional activities pain free.   3. Patient demonstrates improved overall function per FOTO Neck Survey to 40% Limitation or less.   4. Patient will report pain of 2/10 at worst, on 0-10 pain scale, with all activity  5. Patient demonstrates ability to complete Timed Up and Go (TUG) in 12 seconds or less, no AD, no major  LOB    Plan     Plan of care Certification: 5/19/2022 to 8/16/22.    Progress strength, endurance, and balance activities as tolerated    Kumar Wilson, PT

## 2022-05-20 ENCOUNTER — OFFICE VISIT (OUTPATIENT)
Dept: NEUROSURGERY | Facility: CLINIC | Age: 57
End: 2022-05-20
Payer: COMMERCIAL

## 2022-05-20 ENCOUNTER — EXTERNAL HOME HEALTH (OUTPATIENT)
Dept: HOME HEALTH SERVICES | Facility: HOSPITAL | Age: 57
End: 2022-05-20
Payer: COMMERCIAL

## 2022-05-20 VITALS
TEMPERATURE: 100 F | BODY MASS INDEX: 33.95 KG/M2 | SYSTOLIC BLOOD PRESSURE: 125 MMHG | DIASTOLIC BLOOD PRESSURE: 70 MMHG | HEART RATE: 68 BPM | HEIGHT: 64 IN | OXYGEN SATURATION: 95 % | WEIGHT: 198.88 LBS

## 2022-05-20 DIAGNOSIS — M71.38 SYNOVIAL CYST OF LUMBAR FACET JOINT: ICD-10-CM

## 2022-05-20 DIAGNOSIS — Z98.1 S/P CERVICAL SPINAL FUSION: Primary | ICD-10-CM

## 2022-05-20 DIAGNOSIS — M48.062 SPINAL STENOSIS OF LUMBAR REGION WITH NEUROGENIC CLAUDICATION: ICD-10-CM

## 2022-05-20 PROCEDURE — 3074F PR MOST RECENT SYSTOLIC BLOOD PRESSURE < 130 MM HG: ICD-10-PCS | Mod: CPTII,S$GLB,, | Performed by: PHYSICIAN ASSISTANT

## 2022-05-20 PROCEDURE — 3078F DIAST BP <80 MM HG: CPT | Mod: CPTII,S$GLB,, | Performed by: PHYSICIAN ASSISTANT

## 2022-05-20 PROCEDURE — 99024 POSTOP FOLLOW-UP VISIT: CPT | Mod: S$GLB,,, | Performed by: PHYSICIAN ASSISTANT

## 2022-05-20 PROCEDURE — 99999 PR PBB SHADOW E&M-EST. PATIENT-LVL III: ICD-10-PCS | Mod: PBBFAC,,, | Performed by: PHYSICIAN ASSISTANT

## 2022-05-20 PROCEDURE — 99999 PR PBB SHADOW E&M-EST. PATIENT-LVL III: CPT | Mod: PBBFAC,,, | Performed by: PHYSICIAN ASSISTANT

## 2022-05-20 PROCEDURE — 3008F BODY MASS INDEX DOCD: CPT | Mod: CPTII,S$GLB,, | Performed by: PHYSICIAN ASSISTANT

## 2022-05-20 PROCEDURE — 3008F PR BODY MASS INDEX (BMI) DOCUMENTED: ICD-10-PCS | Mod: CPTII,S$GLB,, | Performed by: PHYSICIAN ASSISTANT

## 2022-05-20 PROCEDURE — 1160F PR REVIEW ALL MEDS BY PRESCRIBER/CLIN PHARMACIST DOCUMENTED: ICD-10-PCS | Mod: CPTII,S$GLB,, | Performed by: PHYSICIAN ASSISTANT

## 2022-05-20 PROCEDURE — 1159F PR MEDICATION LIST DOCUMENTED IN MEDICAL RECORD: ICD-10-PCS | Mod: CPTII,S$GLB,, | Performed by: PHYSICIAN ASSISTANT

## 2022-05-20 PROCEDURE — 3078F PR MOST RECENT DIASTOLIC BLOOD PRESSURE < 80 MM HG: ICD-10-PCS | Mod: CPTII,S$GLB,, | Performed by: PHYSICIAN ASSISTANT

## 2022-05-20 PROCEDURE — 1160F RVW MEDS BY RX/DR IN RCRD: CPT | Mod: CPTII,S$GLB,, | Performed by: PHYSICIAN ASSISTANT

## 2022-05-20 PROCEDURE — 1159F MED LIST DOCD IN RCRD: CPT | Mod: CPTII,S$GLB,, | Performed by: PHYSICIAN ASSISTANT

## 2022-05-20 PROCEDURE — 3074F SYST BP LT 130 MM HG: CPT | Mod: CPTII,S$GLB,, | Performed by: PHYSICIAN ASSISTANT

## 2022-05-20 PROCEDURE — 99024 PR POST-OP FOLLOW-UP VISIT: ICD-10-PCS | Mod: S$GLB,,, | Performed by: PHYSICIAN ASSISTANT
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
## 2022-05-27 ENCOUNTER — CLINICAL SUPPORT (OUTPATIENT)
Dept: REHABILITATION | Facility: HOSPITAL | Age: 57
End: 2022-05-27
Attending: NEUROLOGICAL SURGERY
Payer: COMMERCIAL

## 2022-05-27 DIAGNOSIS — Z74.09 IMPAIRED FUNCTIONAL MOBILITY AND ENDURANCE: Primary | ICD-10-CM

## 2022-05-27 DIAGNOSIS — R29.898 DECREASED STRENGTH OF LOWER EXTREMITY: ICD-10-CM

## 2022-05-27 PROCEDURE — 97110 THERAPEUTIC EXERCISES: CPT | Mod: PN,CQ

## 2022-05-27 NOTE — PROGRESS NOTES
"  Physical Therapy Daily Treatment Note     Name: Vicente Greenwood  Clinic Number: [id number]    Therapy Diagnosis:   Encounter Diagnoses   Name Primary?    Impaired functional mobility and endurance Yes    Decreased strength of lower extremity      Physician: Michelle Johnson MD  Visit Date: 5/27/2022    Physician Orders: PT Eval and Treat   Medical Diagnosis from Referral: Cervical spondylosis with myelopathy and radiculopathy  Evaluation Date: 5/19/2022  Authorization Period Expiration: 7/1/22  Plan of Care Expiration: 8/16/22  Visit # / Visits authorized: 2/ 20 (+ eval)  PN Due: 6/16/22    Time In: 1150  Time Out: 1250  Total Billable Time: 60 minutes    Precautions:  s/p C3-4 ACDF on 3/20/22 POW 9 Dr. Johnson    Subjective     Pt reports: Pt states he got compression socks and has noticed an improvement in swelling. Pt states the exercises have helped a lot and feels like he is moving better. Pt denies pain symptoms currently. Pt states he is cleared to return to work on June 11th.   He was compliant with home exercise program.  Response to previous treatment: good, improved mobility  Functional change: moving better with less pain    Pain: 0/10  Location: R posterior LE    Objective     Vicente received therapeutic exercises to develop strength, endurance, ROM, flexibility, posture and core stabilization for 60 minutes including:    Nustep 8 mins +lvl2  +standing hip abduction 3x10  +standing hip extension 3x10  +standing marches (alternating) 3x10   +shuttle leg press 2.5 cords 3x10     HS Str c/ strap 3x30" ea LE  Piriformis Str 3x30" ea LE  Hip Add c/ ball 3x10  Hip Abd GTB 3x10  SAQ 2# 3x10 ea  Bridging 3x10  SLR 2x10 ea   Sit to Stand 18" box 3x10    Vicente received the following manual therapy techniques: Joint mobilizations and Soft tissue Mobilization were applied to the: B LEs for 0 minutes, including:    Vicente received hot pack for 0 minutes.  Vicente received cold pack for 0 minutes.    Home " Exercises Provided and Patient Education Provided     Education provided:   Written Home Exercises Provided: Patient instructed to cont prior HEP.  Exercises were reviewed and Vicente was able to demonstrate them prior to the end of the session.  Vicente demonstrated good  understanding of the education provided.     See EMR under Patient Instructions for exercises provided prior visit.    Assessment     Pt arrived to therapy without complaints of pain today. Introduced additional standing exercises today with appropriate fatigue achieved without complaints of pain. Also introduced shuttle leg press today with good tolerance. Pt continues to ambulate with SPC and may benefit from gait training without A.D in future sessions. Will continue general LE strengthening and mobility with progressions as tolerated.     Vicente Is progressing well towards his goals.   Pt prognosis is Good.     Pt will continue to benefit from skilled outpatient physical therapy to address the deficits listed in the problem list box on initial evaluation, provide pt/family education and to maximize pt's level of independence in the home and community environment.     Pt's spiritual, cultural and educational needs considered and pt agreeable to plan of care and goals.     Anticipated barriers to physical therapy: COVID-19 Concerns    Goals:   Short Term GOALS: 6 weeks. Pt agrees with goals set.  1. Patient demonstrates independence with HEP. In progress  2. Patient demonstrates independence with Postural Awareness. In progress  3. Patient demonstrates independence with body mechanics. In progress  4. Patient will report pain of 4/10 at worst, on 0-10 pain scale, with all activity In progress  5. Patient to complete 8 reps or greater on 30 Second Sit to Stand Test, from standard chair height In progress  6. Patient demonstrates increased strength BLE's to 4/5 or greater to improve tolerance to functional activities pain free.  In  progress    Long Term GOALS: 12 weeks. Pt agrees with goals set. In progress  1. Patient demonstrates ability to walk 2 blocks, indep without AD, no major LOB  2. Patient demonstrates increased strength BLE's to 4+/5 or greater to improve tolerance to functional activities pain free.   3. Patient demonstrates improved overall function per FOTO Neck Survey to 40% Limitation or less.   4. Patient will report pain of 2/10 at worst, on 0-10 pain scale, with all activity  5. Patient demonstrates ability to complete Timed Up and Go (TUG) in 12 seconds or less, no AD, no major LOB    Plan     Plan of care Certification: 5/19/2022 to 8/16/22.    Progress strength, endurance, and balance activities as tolerated    Lucas Canseco, ARNOLD

## 2022-05-27 NOTE — PROGRESS NOTES
"  Physical Therapy Daily Treatment Note     Name: Vicente Greenwood  Clinic Number: [id number]    Therapy Diagnosis:   Encounter Diagnoses   Name Primary?    Impaired functional mobility and endurance Yes    Decreased strength of lower extremity      Physician: Michelle Johnson MD  Visit Date: 5/30/2022    Physician Orders: PT Eval and Treat   Medical Diagnosis from Referral: Cervical spondylosis with myelopathy and radiculopathy  Evaluation Date: 5/19/2022  Authorization Period Expiration: 7/1/22  Plan of Care Expiration: 8/16/22  Visit # / Visits authorized: 3/ 20 (+ eval)  PN Due: 6/16/22    Time In: 1130  Time Out: 1215  Total Billable Time: 45 minutes    Precautions:  s/p C3-4 ACDF on 3/20/22 POW 8 Dr. Johnson    Subjective     Pt reports: that he's been wearing compression stockings, which have helped with his swelling. Also states that he will be returning to work on 06/12.  He was compliant with home exercise program.  Response to previous treatment: none to report yet  Functional change: none to report yet    Pain: 0/10  Location: R posterior LE    Objective     Vicente received therapeutic exercises to develop strength, endurance, ROM, flexibility, posture and core stabilization for 45 minutes including:    Nustep 8 mins  HS Str c/ strap 3x30" ea LE  Piriformis Str 3x30" ea LE  Hip Add c/ ball 3x10  Hip Abd GTB 3x10  SAQ 2# 3x10 ea  Bridging 3x10  SLR 2x15 ea   Sit to Stand 3x10    Vicente received the following manual therapy techniques: Joint mobilizations and Soft tissue Mobilization were applied to the: B LEs for 0 minutes, including:    Vicente received hot pack for 0 minutes.  Vicente received cold pack for 0 minutes.    Home Exercises Provided and Patient Education Provided     Education provided:   Written Home Exercises Provided: yes.  Exercises were reviewed and Vicente was able to demonstrate them prior to the end of the session.  Vicente demonstrated good  understanding of the education provided. "     See EMR under Patient Instructions for exercises provided prior visit.    Assessment     Pt tolerated tx well. Therapist followed primary therapist's POC. Pt arrived to clinic in open toe sandals. Therapist educated patient that it would be safer if he arrived to therapy in sneakers or closed toe shoes. Therapist provided verbal/tactile cues to maintain proper form. Pt reported no adverse effects to exercises. Pt would benefit from continued skilled physical therapy in order to reach pt's goals.     Vicente Is progressing well towards his goals.   Pt prognosis is Good.     Pt will continue to benefit from skilled outpatient physical therapy to address the deficits listed in the problem list box on initial evaluation, provide pt/family education and to maximize pt's level of independence in the home and community environment.     Pt's spiritual, cultural and educational needs considered and pt agreeable to plan of care and goals.     Anticipated barriers to physical therapy: COVID-19 Concerns    Goals:   Short Term GOALS: 6 weeks. Pt agrees with goals set.  1. Patient demonstrates independence with HEP. In progress  2. Patient demonstrates independence with Postural Awareness. In progress  3. Patient demonstrates independence with body mechanics. In progress  4. Patient will report pain of 4/10 at worst, on 0-10 pain scale, with all activity In progress  5. Patient to complete 8 reps or greater on 30 Second Sit to Stand Test, from standard chair height In progress  6. Patient demonstrates increased strength BLE's to 4/5 or greater to improve tolerance to functional activities pain free.  In progress    Long Term GOALS: 12 weeks. Pt agrees with goals set. In progress  1. Patient demonstrates ability to walk 2 blocks, indep without AD, no major LOB  2. Patient demonstrates increased strength BLE's to 4+/5 or greater to improve tolerance to functional activities pain free.   3. Patient demonstrates improved overall  function per FOTO Neck Survey to 40% Limitation or less.   4. Patient will report pain of 2/10 at worst, on 0-10 pain scale, with all activity  5. Patient demonstrates ability to complete Timed Up and Go (TUG) in 12 seconds or less, no AD, no major LOB    Plan     Plan of care Certification: 5/19/2022 to 8/16/22.    Progress strength, endurance, and balance activities as tolerated    Brad Rowan, PT

## 2022-05-30 ENCOUNTER — CLINICAL SUPPORT (OUTPATIENT)
Dept: REHABILITATION | Facility: HOSPITAL | Age: 57
End: 2022-05-30
Attending: NEUROLOGICAL SURGERY
Payer: COMMERCIAL

## 2022-05-30 DIAGNOSIS — Z74.09 IMPAIRED FUNCTIONAL MOBILITY AND ENDURANCE: Primary | ICD-10-CM

## 2022-05-30 DIAGNOSIS — R29.898 DECREASED STRENGTH OF LOWER EXTREMITY: ICD-10-CM

## 2022-05-30 PROCEDURE — 97110 THERAPEUTIC EXERCISES: CPT | Mod: PN

## 2022-06-02 ENCOUNTER — CLINICAL SUPPORT (OUTPATIENT)
Dept: REHABILITATION | Facility: HOSPITAL | Age: 57
End: 2022-06-02
Payer: COMMERCIAL

## 2022-06-02 DIAGNOSIS — Z74.09 IMPAIRED FUNCTIONAL MOBILITY AND ENDURANCE: Primary | ICD-10-CM

## 2022-06-02 DIAGNOSIS — R29.898 DECREASED STRENGTH OF LOWER EXTREMITY: ICD-10-CM

## 2022-06-02 PROCEDURE — 97110 THERAPEUTIC EXERCISES: CPT | Mod: PN

## 2022-06-02 NOTE — PROGRESS NOTES
"  Physical Therapy Daily Treatment Note     Name: Vicente Greenwood  Clinic Number: [id number]    Therapy Diagnosis:   Encounter Diagnoses   Name Primary?    Impaired functional mobility and endurance Yes    Decreased strength of lower extremity      Physician: Michelle Johnson MD  Visit Date: 6/2/2022    Physician Orders: PT Eval and Treat   Medical Diagnosis from Referral: Cervical spondylosis with myelopathy and radiculopathy  Evaluation Date: 5/19/2022  Authorization Period Expiration: 7/1/22  Plan of Care Expiration: 8/16/22  Visit # / Visits authorized: 3/ 20 (+ eval)  PN Due: 6/16/22    Time In: 1300  Time Out: 1350    Total Billable Time: 45 minutes    Precautions:  s/p C3-4 ACDF on 3/20/22 POW 8 Dr. Johnson    Subjective     Pt reports: he continues to get stronger.   He was compliant with home exercise program.  Response to previous treatment: none to report yet  Functional change: none to report yet    Pain: 0/10  Location: R posterior LE    Objective     Vicente received therapeutic exercises to develop strength, endurance, ROM, flexibility, posture and core stabilization for 45 minutes including:    Nustep 8 mins  HS Str c/ strap 3x30" ea LE  Piriformis Str 3x30" ea LE  +standing hip abduction 3x10  RTB  +standing hip extension 3x10  RTB  +standing marches (alternating) 3x10   +shuttle leg press 2.5 cords 3x10      Hip Add c/ ball 3x10  Hip Abd GTB 3x10  SAQ 4 # 3x10 ea  Bridging 3x10  SLR 2x15 ea   Sit to Stand 3x10    Vicente received the following manual therapy techniques: Joint mobilizations and Soft tissue Mobilization were applied to the: B LEs for 0 minutes, including:    Vicente received hot pack for 0 minutes.  Vicente received cold pack for 0 minutes.    Home Exercises Provided and Patient Education Provided     Education provided:   Written Home Exercises Provided: yes.  Exercises were reviewed and Vicente was able to demonstrate them prior to the end of the session.  Vicente demonstrated good  " understanding of the education provided.     See EMR under Patient Instructions for exercises provided prior visit.    Assessment     Pt continues to ambulate with an unsteady gait, in open toe sandals. No c/o increased discomfort with prescribed activities.  Good response to exercise progression with emphasis on LE strengthening.  Pt would benefit from continued skilled physical therapy in order to reach pt's goals.     Vicente Is progressing well towards his goals.   Pt prognosis is Good.     Pt will continue to benefit from skilled outpatient physical therapy to address the deficits listed in the problem list box on initial evaluation, provide pt/family education and to maximize pt's level of independence in the home and community environment.     Pt's spiritual, cultural and educational needs considered and pt agreeable to plan of care and goals.     Anticipated barriers to physical therapy: COVID-19 Concerns    Goals:   Short Term GOALS: 6 weeks. Pt agrees with goals set.  1. Patient demonstrates independence with HEP. In progress  2. Patient demonstrates independence with Postural Awareness. In progress  3. Patient demonstrates independence with body mechanics. In progress  4. Patient will report pain of 4/10 at worst, on 0-10 pain scale, with all activity In progress  5. Patient to complete 8 reps or greater on 30 Second Sit to Stand Test, from standard chair height In progress  6. Patient demonstrates increased strength BLE's to 4/5 or greater to improve tolerance to functional activities pain free.  In progress    Long Term GOALS: 12 weeks. Pt agrees with goals set. In progress  1. Patient demonstrates ability to walk 2 blocks, indep without AD, no major LOB  2. Patient demonstrates increased strength BLE's to 4+/5 or greater to improve tolerance to functional activities pain free.   3. Patient demonstrates improved overall function per FOTO Neck Survey to 40% Limitation or less.   4. Patient will report  pain of 2/10 at worst, on 0-10 pain scale, with all activity  5. Patient demonstrates ability to complete Timed Up and Go (TUG) in 12 seconds or less, no AD, no major LOB    Plan     Plan of care Certification: 5/19/2022 to 8/16/22.    Progress strength, endurance, and balance activities as tolerated    Esequiel Mccoy, PT

## 2022-06-02 NOTE — PROGRESS NOTES
"  Physical Therapy Daily Treatment Note     Name: Vicente Greenwood  Clinic Number: [id number]    Therapy Diagnosis:   No diagnosis found.  Physician: Michelle Johnson MD  Visit Date: 6/2/2022    Physician Orders: PT Eval and Treat   Medical Diagnosis from Referral: Cervical spondylosis with myelopathy and radiculopathy  Evaluation Date: 5/19/2022  Authorization Period Expiration: 7/1/22  Plan of Care Expiration: 8/16/22  Visit # / Visits authorized: 4/ 20 (+ eval)  PN Due: 6/16/22    Time In: 1130  Time Out: 1215  Total Billable Time: 45 minutes    Precautions:  s/p C3-4 ACDF on 3/20/22 POW 10 Dr. Johnson    Subjective     Pt reports: that he's been wearing compression stockings, which have helped with his swelling. Also states that he will be returning to work on 06/12.  He was compliant with home exercise program.  Response to previous treatment: none to report yet  Functional change: none to report yet    Pain: 0/10  Location: R posterior LE    Objective     Vicente received therapeutic exercises to develop strength, endurance, ROM, flexibility, posture and core stabilization for 45 minutes including:      Nustep 8 mins +lvl2  standing hip abduction 3x10  standing hip extension 3x10  standing marches (alternating) 3x10   shuttle leg press 2.5 cords 3x10      HS Str c/ strap 3x30" ea LE  Piriformis Str 3x30" ea LE  Hip Add c/ ball 3x10  Hip Abd GTB 3x10  SAQ 2# 3x10 ea  Bridging 3x10  SLR 2x10 ea   Sit to Stand 18" box 3x10      Vicente received the following manual therapy techniques: Joint mobilizations and Soft tissue Mobilization were applied to the: B LEs for 0 minutes, including:    Vicente received hot pack for 0 minutes.  Vicente received cold pack for 0 minutes.    Home Exercises Provided and Patient Education Provided     Education provided:   Written Home Exercises Provided: yes.  Exercises were reviewed and Vicente was able to demonstrate them prior to the end of the session.  Vicente demonstrated good  " understanding of the education provided.     See EMR under Patient Instructions for exercises provided prior visit.    Assessment     Pt tolerated tx well. Therapist followed primary therapist's POC. Pt arrived to clinic in open toe sandals. Therapist educated patient that it would be safer if he arrived to therapy in sneakers or closed toe shoes. Therapist provided verbal/tactile cues to maintain proper form. Pt reported no adverse effects to exercises. Pt would benefit from continued skilled physical therapy in order to reach pt's goals.     Vicente Is progressing well towards his goals.   Pt prognosis is Good.     Pt will continue to benefit from skilled outpatient physical therapy to address the deficits listed in the problem list box on initial evaluation, provide pt/family education and to maximize pt's level of independence in the home and community environment.     Pt's spiritual, cultural and educational needs considered and pt agreeable to plan of care and goals.     Anticipated barriers to physical therapy: COVID-19 Concerns    Goals:   Short Term GOALS: 6 weeks. Pt agrees with goals set.  1. Patient demonstrates independence with HEP. In progress  2. Patient demonstrates independence with Postural Awareness. In progress  3. Patient demonstrates independence with body mechanics. In progress  4. Patient will report pain of 4/10 at worst, on 0-10 pain scale, with all activity In progress  5. Patient to complete 8 reps or greater on 30 Second Sit to Stand Test, from standard chair height In progress  6. Patient demonstrates increased strength BLE's to 4/5 or greater to improve tolerance to functional activities pain free.  In progress    Long Term GOALS: 12 weeks. Pt agrees with goals set. In progress  1. Patient demonstrates ability to walk 2 blocks, indep without AD, no major LOB  2. Patient demonstrates increased strength BLE's to 4+/5 or greater to improve tolerance to functional activities pain free.    3. Patient demonstrates improved overall function per FOTO Neck Survey to 40% Limitation or less.   4. Patient will report pain of 2/10 at worst, on 0-10 pain scale, with all activity  5. Patient demonstrates ability to complete Timed Up and Go (TUG) in 12 seconds or less, no AD, no major LOB    Plan     Plan of care Certification: 5/19/2022 to 8/16/22.    Progress strength, endurance, and balance activities as tolerated    Lucas Canseco, PTA

## 2022-06-06 ENCOUNTER — CLINICAL SUPPORT (OUTPATIENT)
Dept: REHABILITATION | Facility: HOSPITAL | Age: 57
End: 2022-06-06
Payer: COMMERCIAL

## 2022-06-06 DIAGNOSIS — Z74.09 IMPAIRED FUNCTIONAL MOBILITY AND ENDURANCE: Primary | ICD-10-CM

## 2022-06-06 DIAGNOSIS — R29.898 DECREASED STRENGTH OF LOWER EXTREMITY: ICD-10-CM

## 2022-06-06 PROCEDURE — 97110 THERAPEUTIC EXERCISES: CPT | Mod: PN

## 2022-06-06 NOTE — PROGRESS NOTES
"  Physical Therapy Daily Treatment Note     Name: Vicente Greenwood  Clinic Number: [id number]    Therapy Diagnosis:   Encounter Diagnoses   Name Primary?    Impaired functional mobility and endurance Yes    Decreased strength of lower extremity      Physician: Michelle Johnson MD  Visit Date: 6/6/2022    Physician Orders: PT Eval and Treat   Medical Diagnosis from Referral: Cervical spondylosis with myelopathy and radiculopathy  Evaluation Date: 5/19/2022  Authorization Period Expiration: 7/1/22  Plan of Care Expiration: 8/16/22  Visit # / Visits authorized: 3/ 20 (+ eval)  PN Due: 6/16/22    Time In: 1315  Time Out: 1400    Total Billable Time: 45 minutes    Precautions:  s/p C3-4 ACDF on 3/20/22 POW 8 Dr. Johnson    Subjective     Pt reports: that he has no pain today.  He was compliant with home exercise program.  Response to previous treatment: none to report yet  Functional change: none to report yet    Pain: 0/10  Location: R posterior LE    Objective     Vicente received therapeutic exercises to develop strength, endurance, ROM, flexibility, posture and core stabilization for 45 minutes including:    Nustep 8 mins  HS Str c/ strap 3x30" ea LE  Piriformis Str 3x30" ea LE  standing hip abduction 3x10  RTB  standing hip extension 3x10  RTB  standing marches (alternating) 2x15  +shuttle leg press 2.5 cords 3x10      Hip Add c/ ball 3x10  Hip Abd GTB 3x10  SAQ 4 # 3x10 ea  Bridging 2x15  SLR 1# 3x10 ea   Sit to Stand 3x10    Vicente received the following manual therapy techniques: Joint mobilizations and Soft tissue Mobilization were applied to the: B LEs for 0 minutes, including:    Vicente received hot pack for 0 minutes.  Vicente received cold pack for 0 minutes.    Home Exercises Provided and Patient Education Provided     Education provided:   Written Home Exercises Provided: yes.  Exercises were reviewed and Vicente was able to demonstrate them prior to the end of the session.  Vicente demonstrated good  " understanding of the education provided.     See EMR under Patient Instructions for exercises provided prior visit.    Assessment     Pt tolerated tx well. Pt entered clinic w/ open toes shoes despite recommendations to wear sneakers in previous sessions. Pt was progressed on his repetitions to increase his BLE strength. Therapist provided verbal/tactile cues to maintain proper form. Pt reported no adverse effects to exercises. Pt would benefit from continued skilled physical therapy in order to reach pt's goals.     Vicente Is progressing well towards his goals.   Pt prognosis is Good.     Pt will continue to benefit from skilled outpatient physical therapy to address the deficits listed in the problem list box on initial evaluation, provide pt/family education and to maximize pt's level of independence in the home and community environment.     Pt's spiritual, cultural and educational needs considered and pt agreeable to plan of care and goals.     Anticipated barriers to physical therapy: COVID-19 Concerns    Goals:   Short Term GOALS: 6 weeks. Pt agrees with goals set.  1. Patient demonstrates independence with HEP. In progress  2. Patient demonstrates independence with Postural Awareness. In progress  3. Patient demonstrates independence with body mechanics. In progress  4. Patient will report pain of 4/10 at worst, on 0-10 pain scale, with all activity In progress  5. Patient to complete 8 reps or greater on 30 Second Sit to Stand Test, from standard chair height In progress  6. Patient demonstrates increased strength BLE's to 4/5 or greater to improve tolerance to functional activities pain free.  In progress    Long Term GOALS: 12 weeks. Pt agrees with goals set. In progress  1. Patient demonstrates ability to walk 2 blocks, indep without AD, no major LOB  2. Patient demonstrates increased strength BLE's to 4+/5 or greater to improve tolerance to functional activities pain free.   3. Patient demonstrates  improved overall function per FOTO Neck Survey to 40% Limitation or less.   4. Patient will report pain of 2/10 at worst, on 0-10 pain scale, with all activity  5. Patient demonstrates ability to complete Timed Up and Go (TUG) in 12 seconds or less, no AD, no major LOB    Plan     Plan of care Certification: 5/19/2022 to 8/16/22.    Progress strength, endurance, and balance activities as tolerated    Brad Rowan, PT

## 2022-06-08 ENCOUNTER — CLINICAL SUPPORT (OUTPATIENT)
Dept: REHABILITATION | Facility: HOSPITAL | Age: 57
End: 2022-06-08
Payer: COMMERCIAL

## 2022-06-08 DIAGNOSIS — Z74.09 IMPAIRED FUNCTIONAL MOBILITY AND ENDURANCE: Primary | ICD-10-CM

## 2022-06-08 DIAGNOSIS — R29.898 DECREASED STRENGTH OF LOWER EXTREMITY: ICD-10-CM

## 2022-06-08 PROCEDURE — 97110 THERAPEUTIC EXERCISES: CPT | Mod: PN

## 2022-06-08 NOTE — PROGRESS NOTES
"  Physical Therapy Daily Treatment Note     Name: Vicente Greenwood  Clinic Number: [id number]    Therapy Diagnosis:   No diagnosis found.  Physician: Michelle Johnson MD  Visit Date: 6/8/2022    Physician Orders: PT Eval and Treat   Medical Diagnosis from Referral: Cervical spondylosis with myelopathy and radiculopathy  Evaluation Date: 5/19/2022  Authorization Period Expiration: 7/1/22  Plan of Care Expiration: 8/16/22  Visit # / Visits authorized: 6/ 20 (+ eval)  PN Due: 6/16/22    Time In: ***  Time Out: ***    Total Billable Time: *** minutes    Precautions:  s/p C3-4 ACDF on 3/20/22 POW 11 as of Dr. Johnson    Subjective     Pt reports: ***that he has no pain today.  He was compliant with home exercise program.  Response to previous treatment: none to report yet  Functional change: none to report yet    Pain: 0/10  Location: R posterior LE    Objective     Vicente received therapeutic exercises to develop strength, endurance, ROM, flexibility, posture and core stabilization for *** minutes including:    Nustep 8 mins  HS Str c/ strap 3x30" ea LE  Piriformis Str 3x30" ea LE  standing hip abduction 3x10  RTB  standing hip extension 3x10  RTB  standing marches (alternating) 2x15  +shuttle leg press 2.5 cords 3x10      Hip Add c/ ball 3x10  Hip Abd GTB 3x10  SAQ 4 # 3x10 ea  Bridging 2x15  SLR 1# 3x10 ea   Sit to Stand 3x10    Vicente received the following manual therapy techniques: Joint mobilizations and Soft tissue Mobilization were applied to the: B LEs for 0 minutes, including:    Vicente received hot pack for 0 minutes.  Vicente received cold pack for 0 minutes.    Home Exercises Provided and Patient Education Provided     Education provided:   Written Home Exercises Provided: yes.  Exercises were reviewed and Vicente was able to demonstrate them prior to the end of the session.  Vicente demonstrated good  understanding of the education provided.     See EMR under Patient Instructions for exercises provided " prior visit.    Assessment     ***Pt tolerated tx well. Pt entered clinic w/ open toes shoes despite recommendations to wear sneakers in previous sessions. Pt was progressed on his repetitions to increase his BLE strength. Therapist provided verbal/tactile cues to maintain proper form. Pt reported no adverse effects to exercises. Pt would benefit from continued skilled physical therapy in order to reach pt's goals.     Vicente Is progressing well towards his goals.   Pt prognosis is Good.     Pt will continue to benefit from skilled outpatient physical therapy to address the deficits listed in the problem list box on initial evaluation, provide pt/family education and to maximize pt's level of independence in the home and community environment.     Pt's spiritual, cultural and educational needs considered and pt agreeable to plan of care and goals.     Anticipated barriers to physical therapy: COVID-19 Concerns    Goals:   Short Term GOALS: 6 weeks. Pt agrees with goals set.  1. Patient demonstrates independence with HEP. In progress  2. Patient demonstrates independence with Postural Awareness. In progress  3. Patient demonstrates independence with body mechanics. In progress  4. Patient will report pain of 4/10 at worst, on 0-10 pain scale, with all activity In progress  5. Patient to complete 8 reps or greater on 30 Second Sit to Stand Test, from standard chair height In progress  6. Patient demonstrates increased strength BLE's to 4/5 or greater to improve tolerance to functional activities pain free.  In progress    Long Term GOALS: 12 weeks. Pt agrees with goals set. In progress  1. Patient demonstrates ability to walk 2 blocks, indep without AD, no major LOB  2. Patient demonstrates increased strength BLE's to 4+/5 or greater to improve tolerance to functional activities pain free.   3. Patient demonstrates improved overall function per FOTO Neck Survey to 40% Limitation or less.   4. Patient will report pain  of 2/10 at worst, on 0-10 pain scale, with all activity  5. Patient demonstrates ability to complete Timed Up and Go (TUG) in 12 seconds or less, no AD, no major LOB    Plan     Plan of care Certification: 5/19/2022 to 8/16/22.    Progress strength, endurance, and balance activities as tolerated    Rupinder Trevino, PT,DPT  06/08/2022

## 2022-06-08 NOTE — PROGRESS NOTES
Physical Therapy Daily Treatment Note     Name: Vicente Greenwood  Clinic Number: [id number]    Therapy Diagnosis:   Encounter Diagnoses   Name Primary?    Impaired functional mobility and endurance Yes    Decreased strength of lower extremity      Physician: Michelle Johnson MD  Visit Date: 6/8/2022    Physician Orders: PT Eval and Treat   Medical Diagnosis from Referral: Cervical spondylosis with myelopathy and radiculopathy  Evaluation Date: 5/19/2022  Authorization Period Expiration: 7/1/22  Plan of Care Expiration: 8/16/22  Visit # / Visits authorized: 6/ 20 (+ eval)    PN Due: 6/16/22    Time In: 3:30pm  Time Out: 4:30pm    Total Billable Time: 60 minutes (4TE)    Precautions:  s/p C3-4 ACDF on 3/20/22 POW 11 Dr. Johnson    Subjective     Pt reports: pt reports that he has been making a lot of strides in his strengthening. He returns to work on Sunday and is curious what he can and cannot do. He was provided with some restrictions from his doctor.   He was compliant with home exercise program.  Response to previous treatment: none to report yet  Functional change: returning to work     Pain: 0/10  Location: R posterior LE    Objective     Hamstring length - within normal limits no pain and no limitation pt able to obatain 90'  Hip Mobility: Slightly limited hip mobility bilateral but no pain and no muscle issues.     Strength:  MMT Quad 4+/5 bilateral  MMT hamstrings 4+/5 bilateral  MMT hip flexors 4/5 bilateral     Vicente received therapeutic exercises to develop strength, endurance, ROM, flexibility, posture and core stabilization for 60 minutes including tests and measures above:    Nustep 8 mins level 3  Bridging 4# - 2x15  SLR 2# 3x15 bilateral    Sit to Stand 3x10 7.5#  Step ups large 1st step bilateral 3x10  standing hip abduction 3x15  red theraband  Shuttle hip extension 1 red (12.5#) 2x10  standing hip extension 3x10  RTB  standing marches (alternating) 2x15  Matrix knee ext machine - double leg 35#  - 3x10  +shuttle leg press 2.5 cords 3x10      Hip Add c/ ball 3x10  Hip Abd GTB 3x10  SAQ 4 # 3x10 ea      Vicente received the following manual therapy techniques: Joint mobilizations and Soft tissue Mobilization were applied to the: B LEs for 0 minutes, including:    Vicente received hot pack for 0 minutes.  iVcente received cold pack for 0 minutes.    Home Exercises Provided and Patient Education Provided     Education provided:   Written Home Exercises Provided: yes.  Exercises were reviewed and Vicente was able to demonstrate them prior to the end of the session.  Vicente demonstrated good  understanding of the education provided.     See EMR under Patient Instructions for exercises provided prior visit.    Assessment     Pt presents to PT today with continued bilateral lower extremity weakness right> Left. Based on assessment today, pt was advanced in several exercises. He was taken through more functional activities that he may have to do both at home and at work. Pt provided with at 15# wt restriction from his doctor and this was encouraged. Pt was advised to make sure he has enough seated rest breaks to prevent any falls. Pt to continue to be advanced in strengthening as return of strength is progressing quickly.     Vicente Is progressing well towards his goals.   Pt prognosis is Good.     Pt will continue to benefit from skilled outpatient physical therapy to address the deficits listed in the problem list box on initial evaluation, provide pt/family education and to maximize pt's level of independence in the home and community environment.     Pt's spiritual, cultural and educational needs considered and pt agreeable to plan of care and goals.     Anticipated barriers to physical therapy: COVID-19 Concerns    Goals:   Short Term GOALS: 6 weeks. Pt agrees with goals set.  1. Patient demonstrates independence with HEP. In progress  2. Patient demonstrates independence with Postural Awareness. In  progress  3. Patient demonstrates independence with body mechanics. In progress  4. Patient will report pain of 4/10 at worst, on 0-10 pain scale, with all activity In progress  5. Patient to complete 8 reps or greater on 30 Second Sit to Stand Test, from standard chair height In progress  6. Patient demonstrates increased strength BLE's to 4/5 or greater to improve tolerance to functional activities pain free.  In progress    Long Term GOALS: 12 weeks. Pt agrees with goals set. In progress  1. Patient demonstrates ability to walk 2 blocks, indep without AD, no major LOB  2. Patient demonstrates increased strength BLE's to 4+/5 or greater to improve tolerance to functional activities pain free.   3. Patient demonstrates improved overall function per FOTO Neck Survey to 40% Limitation or less.   4. Patient will report pain of 2/10 at worst, on 0-10 pain scale, with all activity  5. Patient demonstrates ability to complete Timed Up and Go (TUG) in 12 seconds or less, no AD, no major LOB    Plan     Plan of care Certification: 5/19/2022 to 8/16/22.    Progress strength, endurance, and balance activities as tolerated    Araceli Li, PT

## 2022-06-16 NOTE — PROGRESS NOTES
Physical Therapy Daily Treatment Note     Name: Vicente Greenwood  Clinic Number: [id number]    Therapy Diagnosis:   No diagnosis found.  Physician: Michelle Johnson MD  Visit Date: 6/17/2022    Physician Orders: PT Eval and Treat   Medical Diagnosis from Referral: Cervical spondylosis with myelopathy and radiculopathy  Evaluation Date: 5/19/2022  Authorization Period Expiration: 7/1/22  Plan of Care Expiration: 8/16/22  Visit # / Visits authorized: 7/ 20 (+ eval)    PN Due: 7/8/22    Time In: ***  Time Out: ***    Total Billable Time: *** minutes (***TE)    Precautions:  s/p C3-4 ACDF on 3/20/22 POW 11 Dr. Johnson    Subjective     Pt reports: ***pt reports that he has been making a lot of strides in his strengthening. He returns to work on Sunday and is curious what he can and cannot do. He was provided with some restrictions from his doctor.   He was compliant with home exercise program.  Response to previous treatment: none to report yet  Functional change: returning to work     Pain: 0/10  Location: R posterior LE    Objective     6/16/22:    Timed up and Go:  30 second sit to stand:     CMS Impairment/Limitation/Restriction for FOTO Neck Survey    Therapist reviewed FOTO scores for Vicente Greenwood on 6/17/2022.   FOTO documents entered into [employer] - see Media section.    Limitation Score: ***%       Vicente received therapeutic exercises to develop strength, endurance, ROM, flexibility, posture and core stabilization for *** minutes including tests and measures above:    Nustep 8 mins level 3  Bridging 4# - 2x15  SLR 2# 3x15 bilateral    Sit to Stand 3x10 7.5#  Step ups large 1st step bilateral 3x10  standing hip abduction 3x15  red theraband  Shuttle hip extension 1 red (12.5#) 2x10  standing hip extension 3x10  RTB  standing marches (alternating) 2x15  Matrix knee ext machine - double leg 35# - 3x10  +shuttle leg press 2.5 cords 3x10      Hip Add c/ ball 3x10  Hip Abd GTB 3x10  SAQ 4 # 3x10 ea      Vicente  received the following manual therapy techniques: Joint mobilizations and Soft tissue Mobilization were applied to the: B LEs for 0 minutes, including:    Vicente received hot pack for 0 minutes.  Vicente received cold pack for 0 minutes.    Home Exercises Provided and Patient Education Provided     Education provided:   Written Home Exercises Provided: yes.  Exercises were reviewed and Vicente was able to demonstrate them prior to the end of the session.  Vciente demonstrated good  understanding of the education provided.     See EMR under Patient Instructions for exercises provided prior visit.    Assessment     ***Pt presents to PT today with continued bilateral lower extremity weakness right> Left. Based on assessment today, pt was advanced in several exercises. He was taken through more functional activities that he may have to do both at home and at work. Pt provided with at 15# wt restriction from his doctor and this was encouraged. Pt was advised to make sure he has enough seated rest breaks to prevent any falls. Pt to continue to be advanced in strengthening as return of strength is progressing quickly.     Vicente Is progressing well towards his goals.   Pt prognosis is Good.     Pt will continue to benefit from skilled outpatient physical therapy to address the deficits listed in the problem list box on initial evaluation, provide pt/family education and to maximize pt's level of independence in the home and community environment.     Pt's spiritual, cultural and educational needs considered and pt agreeable to plan of care and goals.     Anticipated barriers to physical therapy: COVID-19 Concerns    Goals:   Short Term GOALS: 6 weeks. Pt agrees with goals set.  1. Patient demonstrates independence with HEP. In progress  2. Patient demonstrates independence with Postural Awareness. In progress  3. Patient demonstrates independence with body mechanics. In progress  4. Patient will report pain of 4/10 at  worst, on 0-10 pain scale, with all activity In progress  5. Patient to complete 8 reps or greater on 30 Second Sit to Stand Test, from standard chair height In progress  6. Patient demonstrates increased strength BLE's to 4/5 or greater to improve tolerance to functional activities pain free.  In progress    Long Term GOALS: 12 weeks. Pt agrees with goals set. In progress  1. Patient demonstrates ability to walk 2 blocks, indep without AD, no major LOB  2. Patient demonstrates increased strength BLE's to 4+/5 or greater to improve tolerance to functional activities pain free.   3. Patient demonstrates improved overall function per FOTO Neck Survey to 40% Limitation or less.   4. Patient will report pain of 2/10 at worst, on 0-10 pain scale, with all activity  5. Patient demonstrates ability to complete Timed Up and Go (TUG) in 12 seconds or less, no AD, no major LOB    Plan     Plan of care Certification: 5/19/2022 to 8/16/22.    Progress strength, endurance, and balance activities as tolerated    Rupinder Trevino, PT,DPT  06/16/2022

## 2022-06-17 ENCOUNTER — CLINICAL SUPPORT (OUTPATIENT)
Dept: REHABILITATION | Facility: HOSPITAL | Age: 57
End: 2022-06-17
Payer: COMMERCIAL

## 2022-06-17 DIAGNOSIS — R29.898 DECREASED STRENGTH OF LOWER EXTREMITY: ICD-10-CM

## 2022-06-17 DIAGNOSIS — Z74.09 IMPAIRED FUNCTIONAL MOBILITY AND ENDURANCE: Primary | ICD-10-CM

## 2022-06-17 PROCEDURE — 97110 THERAPEUTIC EXERCISES: CPT | Mod: PN

## 2022-06-17 NOTE — PROGRESS NOTES
Physical Therapy Daily Treatment Note     Name: Vicente Greenwood  Clinic Number: [id number]    Therapy Diagnosis:   Encounter Diagnoses   Name Primary?    Impaired functional mobility and endurance Yes    Decreased strength of lower extremity      Physician: Michelle Johnson MD  Visit Date: 6/17/2022    Physician Orders: PT Eval and Treat   Medical Diagnosis from Referral: Cervical spondylosis with myelopathy and radiculopathy  Evaluation Date: 5/19/2022  Authorization Period Expiration: 7/1/22  Plan of Care Expiration: 8/16/22  Visit # / Visits authorized: 7/ 20 (+ eval)    PN Due: 6/16/22    Time In: 7:00 am  Time Out: 7:40 am    Total Billable Time: 40 minutes     Precautions:  s/p C3-4 ACDF on 3/20/22 POW 12 weeks 5 days Dr. Johnson    Subjective     Pt reports: that he has return to work. Pt states he cont needs therapy since he still feels some weakness.  He was compliant with home exercise program.  Response to previous treatment: none to report yet  Functional change: returning to work     Pain: 0/10  Location: R posterior LE    Objective       Vicente received therapeutic exercises to develop strength, endurance, ROM, flexibility, posture and core stabilization for 45 minutes including tests and measures above:    Nustep 8 mins level 3  Mini-squat 3x10  standing marches (alternating) 2x15  Standing hip abd RTB 3x10   Standing hip extension RTB 3x10  Standing SLR RTB 3x10   Sit to Stand 3x10 7.5#  Step ups large 1st step bilateral 3x10  Side step YTB around knee pole to pole 2 laps   Shuttle hip extension 1 red (12.5#) 2x10    Matrix knee ext machine - double leg 35# - 3x10  +shuttle leg press 2.5 cords 3x10          Vicente received the following manual therapy techniques: Joint mobilizations and Soft tissue Mobilization were applied to the: B LEs for 0 minutes, including:    Vicente received hot pack for 0 minutes.  Vicente received cold pack for 0 minutes.    Home Exercises Provided and Patient Education  Provided     Education provided:   Written Home Exercises Provided: yes.  Exercises were reviewed and Vicente was able to demonstrate them prior to the end of the session.  Vicente demonstrated good  understanding of the education provided.     See EMR under Patient Instructions for exercises provided prior visit.    Assessment     Pt is 12 weeks and 5 days s/p C3-4 ACDF. Pt presents to PT today with continued bilateral lower extremity weakness right> Left. Progress B LE muscles strength to CKC exercises today. Pt demonstrated difficult side step and step up and down 4 in box due to weakness of quad and hip abd. Pt required minor/mod v/c's to perform exercises with proper form. Pt was advised to make sure he has enough seated rest breaks to prevent any falls. Pt to continue to be advanced in strengthening as return of strength is progressing quickly.     Vicente Is progressing well towards his goals.   Pt prognosis is Good.     Pt will continue to benefit from skilled outpatient physical therapy to address the deficits listed in the problem list box on initial evaluation, provide pt/family education and to maximize pt's level of independence in the home and community environment.     Pt's spiritual, cultural and educational needs considered and pt agreeable to plan of care and goals.     Anticipated barriers to physical therapy: COVID-19 Concerns    Goals:   Short Term GOALS: 6 weeks. Pt agrees with goals set.  1. Patient demonstrates independence with HEP. In progress  2. Patient demonstrates independence with Postural Awareness. In progress  3. Patient demonstrates independence with body mechanics. In progress  4. Patient will report pain of 4/10 at worst, on 0-10 pain scale, with all activity In progress  5. Patient to complete 8 reps or greater on 30 Second Sit to Stand Test, from standard chair height In progress  6. Patient demonstrates increased strength BLE's to 4/5 or greater to improve tolerance to  functional activities pain free.  In progress    Long Term GOALS: 12 weeks. Pt agrees with goals set. In progress  1. Patient demonstrates ability to walk 2 blocks, indep without AD, no major LOB  2. Patient demonstrates increased strength BLE's to 4+/5 or greater to improve tolerance to functional activities pain free.   3. Patient demonstrates improved overall function per FOTO Neck Survey to 40% Limitation or less.   4. Patient will report pain of 2/10 at worst, on 0-10 pain scale, with all activity  5. Patient demonstrates ability to complete Timed Up and Go (TUG) in 12 seconds or less, no AD, no major LOB    Plan     Plan of care Certification: 5/19/2022 to 8/16/22.    Progress strength, endurance, and balance activities as tolerated    Hung Steele, PT

## 2022-06-21 ENCOUNTER — CLINICAL SUPPORT (OUTPATIENT)
Dept: REHABILITATION | Facility: HOSPITAL | Age: 57
End: 2022-06-21
Payer: COMMERCIAL

## 2022-06-21 DIAGNOSIS — R29.898 DECREASED STRENGTH OF LOWER EXTREMITY: ICD-10-CM

## 2022-06-21 DIAGNOSIS — Z74.09 IMPAIRED FUNCTIONAL MOBILITY AND ENDURANCE: Primary | ICD-10-CM

## 2022-06-21 PROCEDURE — 97110 THERAPEUTIC EXERCISES: CPT | Mod: PN,CQ

## 2022-06-21 NOTE — PROGRESS NOTES
Physical Therapy Daily Progress Note     Name: Vicente Greenwood  Clinic Number: [id number]    Therapy Diagnosis:   Encounter Diagnoses   Name Primary?    Impaired functional mobility and endurance Yes    Decreased strength of lower extremity      Physician: Michelle Johnson MD  Visit Date: 6/21/2022    Physician Orders: PT Eval and Treat   Medical Diagnosis from Referral: Cervical spondylosis with myelopathy and radiculopathy  Evaluation Date: 5/19/2022  Authorization Period Expiration: 7/1/22  Plan of Care Expiration: 8/16/22  Visit # / Visits authorized: 8/ 20 (+ eval)    PN Due: 7/21/22    Time In: 0800  Time Out: 0900    Total Billable Time: 60 minutes     Precautions:  s/p C3-4 ACDF on 3/20/22 POW 13 weeks     Subjective     Pt reports: Pt states his return to work is going well. He is able to stand for longer periods of time and perform his job tasks without pain. Pt states he still takes some seated rest breaks throughout the day. Pt states he wants to be able to cut his grass but has not tried it yet.   He was compliant with home exercise program.  Response to previous treatment: good, feels stronger  Functional change: returning to work without pain    Pain: 0/10  Location: R posterior LE    Objective       30 second STS=  12 reps  TUG= 15 seconds    Strength: Knee    Left Right   Quadriceps 4+/5 4/5   Hamstrings 4/5 4/5      Strength: Hip    Left Right   Iliopsoas 4/5 4-/5   Glute Med 4- 3+/5   Hip Ext 4-/5 4-/5   Ankle PF 4+/5 4/5   Ankle DF 4+/5 4/5      Functional Limitations Reports - G Codes  Category: Mobility  Tool: FOTO Neck Survey  Score: 19% Limitation    Vicente received therapeutic exercises to develop strength, endurance, ROM, flexibility, posture and core stabilization for 60 minutes including tests and measures above: reassessment    Nustep 8 mins level 3  Mini-squat 3x10  standing marches +RTB (alternating) 2x15  Standing hip abd RTB 3x10   Standing hip extension RTB 3x10  Standing SLR  "RTB 3x10   Sit to Stand 3x10 7.5#  Step ups 6" bilateral 3x10  Side step +RTB around knee pole to pole 2 laps   Shuttle hip extension 1 red (12.5#) 2x10    Matrix knee ext machine - double leg 35# - 3x10  shuttle leg press 2.5 cords 3x10   +sled push 25#- 2 laps on turf          Home Exercises Provided and Patient Education Provided     Education provided:   -issued/reviewed updated HEP    Written Home Exercises Provided: yes.  Exercises were reviewed and Vicente was able to demonstrate them prior to the end of the session.  Vicente demonstrated good  understanding of the education provided.     See EMR under Patient Instructions for exercises provided 6/21/2022.    Assessment     Continued with general LE strengthening today which pt continues to tolerate well. He was able to progress to RTB with standing marches and resisted side stepping today with appropriate fatigue achieved. Introduced sled push for functional strengthening and to mimic task of cutting grass which pt states he would like to be able to do. He admits some difficulty/fatigue however was able to complete activity without complaint of pain. He continues to arrive to therapy with SPC however does not use it throughout session and does not experience any LOB. He demonstrates improvement with 30 second STS and improved time with TUG today. Will benefit from continued strengthening for remaining scheduled visits with progression towards independent strengthening and DC.     Vicente Is progressing well towards his goals.   Pt prognosis is Good.     Pt will continue to benefit from skilled outpatient physical therapy to address the deficits listed in the problem list box on initial evaluation, provide pt/family education and to maximize pt's level of independence in the home and community environment.     Pt's spiritual, cultural and educational needs considered and pt agreeable to plan of care and goals.     Anticipated barriers to physical therapy: " COVID-19 Concerns    Goals:   Short Term GOALS: 6 weeks. Pt agrees with goals set.  Updated 6/21/22, Partially MET  1. Patient demonstrates independence with HEP. met  2. Patient demonstrates independence with Postural Awareness. met  3. Patient demonstrates independence with body mechanics. met  4. Patient will report pain of 4/10 at worst, on 0-10 pain scale, with all activity met  5. Patient to complete 8 reps or greater on 30 Second Sit to Stand Test, from standard chair height met  6. Patient demonstrates increased strength BLE's to 4/5 or greater to improve tolerance to functional activities pain free.  In progress    Long Term GOALS: 12 weeks. Pt agrees with goals set. In progress  1. Patient demonstrates ability to walk 2 blocks, indep without AD, no major LOB in progress  2. Patient demonstrates increased strength BLE's to 4+/5 or greater to improve tolerance to functional activities pain free. In progress  3. Patient demonstrates improved overall function per FOTO Neck Survey to 40% Limitation or less. met  4. Patient will report pain of 2/10 at worst, on 0-10 pain scale, with all activity, met  5. Patient demonstrates ability to complete Timed Up and Go (TUG) in 12 seconds or less, no AD, no major LOB, in progress    Plan     Plan of care Certification: 5/19/2022 to 8/16/22.    Progress strength, endurance, and balance activities as tolerated.  Will continue 3 more visits progressing towards d/c planning.     Lucas Canseco, PTA

## 2022-06-22 NOTE — PROGRESS NOTES
"  Physical Therapy Daily Progress Note     Name: Vicente Greenwood  Clinic Number: [id number]    Therapy Diagnosis:   Encounter Diagnoses   Name Primary?    Impaired functional mobility and endurance Yes    Decreased strength of lower extremity      Physician: Michelle Johnson MD  Visit Date: 6/23/2022    Physician Orders: PT Eval and Treat   Medical Diagnosis from Referral: Cervical spondylosis with myelopathy and radiculopathy  Evaluation Date: 5/19/2022  Authorization Period Expiration: 7/1/22  Plan of Care Expiration: 8/16/22  Visit # / Visits authorized: 9/ 20 (+ eval)    PN Due: 7/21/22    Time In: 0655a  Time Out: 0749a    Total Billable Time: 54 minutes     Precautions:  s/p C3-4 ACDF on 3/20/22 POW 13 weeks     Subjective     Pt reports: He is doing well today. He felt good after his last session and just had some soreness.    He was compliant with home exercise program.  Response to previous treatment: good, feels stronger  Functional change: returning to work without pain    Pain: 0/10  Location: R posterior LE    Objective       Vicente received therapeutic exercises to develop strength, endurance, ROM, flexibility, posture and core stabilization for 54 minutes including:    Nustep 8 mins level +5     standing marches +RTB (alternating) 2x15  Standing hip abd RTB 3x10   Standing hip extension RTB 3x10  Standing SLR RTB 3x10   Sit to Stand 3x10 7.5#  Step ups 6" bilateral 3x10  Side step +RTB around knee pole to pole 2 laps     Matrix knee ext machine - double leg 35# - 3x10  +Matrix kne flex - double leg 25# 2x10  Shuttle hip extension 1 red (12.5#) 2x10 ea  shuttle leg press 2.5 cords 3x10 (increase weight NV)  +Single leg shuttle press 2.5 cords 2x10 ea  sled push 25#- 2 laps on turf   +farmer's carry 10# 2 laps on turf  +palloff press GTB 20x ea     Home Exercises Provided and Patient Education Provided     Education provided:   -issued/reviewed updated HEP    Written Home Exercises Provided: " yes.  Exercises were reviewed and Vicente was able to demonstrate them prior to the end of the session.  Vicente demonstrated good  understanding of the education provided.     See EMR under Patient Instructions for exercises provided 6/21/2022.    Assessment     Pt arrived to session without SPC cane. PT progressed today's session to incorporate more core, hamstring and functional strengthening. Verbal cues for proper core engagement with farmer's carries and paloff press to decrease discomfort in low back. Pt reported improvements with cueing. Pt was appropriately fatigued at end of treatment session. PT to continue progressing functional strength to allow pt to begin to transition to independent HEP.     Vicente Is progressing well towards his goals.   Pt prognosis is Good.     Pt will continue to benefit from skilled outpatient physical therapy to address the deficits listed in the problem list box on initial evaluation, provide pt/family education and to maximize pt's level of independence in the home and community environment.     Pt's spiritual, cultural and educational needs considered and pt agreeable to plan of care and goals.     Anticipated barriers to physical therapy: COVID-19 Concerns    Goals:   Short Term GOALS: 6 weeks. Pt agrees with goals set.  Updated 6/21/22, Partially MET  1. Patient demonstrates independence with HEP. met  2. Patient demonstrates independence with Postural Awareness. met  3. Patient demonstrates independence with body mechanics. met  4. Patient will report pain of 4/10 at worst, on 0-10 pain scale, with all activity met  5. Patient to complete 8 reps or greater on 30 Second Sit to Stand Test, from standard chair height met  6. Patient demonstrates increased strength BLE's to 4/5 or greater to improve tolerance to functional activities pain free.  In progress    Long Term GOALS: 12 weeks. Pt agrees with goals set. In progress  1. Patient demonstrates ability to walk 2 blocks,  indep without AD, no major LOB in progress  2. Patient demonstrates increased strength BLE's to 4+/5 or greater to improve tolerance to functional activities pain free. In progress  3. Patient demonstrates improved overall function per FOTO Neck Survey to 40% Limitation or less. met  4. Patient will report pain of 2/10 at worst, on 0-10 pain scale, with all activity, met  5. Patient demonstrates ability to complete Timed Up and Go (TUG) in 12 seconds or less, no AD, no major LOB, in progress    Plan     Plan of care Certification: 5/19/2022 to 8/16/22.    Progress strength, endurance, and balance activities as tolerated.  Will continue 3 more visits progressing towards d/c planning.     Rupinder Trevino, PT,DPT  06/23/2022

## 2022-06-23 ENCOUNTER — CLINICAL SUPPORT (OUTPATIENT)
Dept: REHABILITATION | Facility: HOSPITAL | Age: 57
End: 2022-06-23
Payer: COMMERCIAL

## 2022-06-23 DIAGNOSIS — Z74.09 IMPAIRED FUNCTIONAL MOBILITY AND ENDURANCE: Primary | ICD-10-CM

## 2022-06-23 DIAGNOSIS — R29.898 DECREASED STRENGTH OF LOWER EXTREMITY: ICD-10-CM

## 2022-06-23 PROCEDURE — 97110 THERAPEUTIC EXERCISES: CPT | Mod: PN

## 2022-07-05 ENCOUNTER — CLINICAL SUPPORT (OUTPATIENT)
Dept: REHABILITATION | Facility: HOSPITAL | Age: 57
End: 2022-07-05
Payer: COMMERCIAL

## 2022-07-05 DIAGNOSIS — R29.898 DECREASED STRENGTH OF LOWER EXTREMITY: ICD-10-CM

## 2022-07-05 DIAGNOSIS — Z74.09 IMPAIRED FUNCTIONAL MOBILITY AND ENDURANCE: Primary | ICD-10-CM

## 2022-07-05 PROCEDURE — 97110 THERAPEUTIC EXERCISES: CPT | Mod: PN

## 2022-07-05 NOTE — PROGRESS NOTES
"  Physical Therapy Daily Progress Note     Name: Vicente Greenwood  Clinic Number: [id number]    Therapy Diagnosis:   Encounter Diagnoses   Name Primary?    Impaired functional mobility and endurance Yes    Decreased strength of lower extremity      Physician: Michelle Johnson MD  Visit Date: 7/5/2022    Physician Orders: PT Eval and Treat   Medical Diagnosis from Referral: Cervical spondylosis with myelopathy and radiculopathy  Evaluation Date: 5/19/2022  Authorization Period Expiration: 7/1/22  Plan of Care Expiration: 8/16/22  Visit # / Visits authorized: 10/ 20 (+ eval)    PN Due: 7/21/22    Time In: 0700a  Time Out: 0757a    Total Billable Time: 57 minutes     Precautions:  s/p C3-4 ACDF on 3/20/22 POW 13 weeks     Subjective     Pt reports:He has been doing well. He returned to work and has a lot of the gym equipment that is similar to at therapy and anticipates keeping up with his exercises once he is done with therapy.   He was compliant with home exercise program.  Response to previous treatment: good, feels stronger  Functional change: returning to work without pain    Pain: 0/10  Location: R posterior LE    Objective       Vicente received therapeutic exercises to develop strength, endurance, ROM, flexibility, posture and core stabilization for 57 minutes including:    Nustep 8 mins level 5     standing marches +RTB (alternating) 2x15  Standing hip abd RTB 3x10   Standing hip extension RTB 3x10  Standing SLR RTB 3x10   Sit to Stand 3x10 7.5#  Step ups 6" bilateral 3x10  Side step RTB around knee pole to pole +3 laps     Matrix knee ext machine - double leg 35# - 3x10  Matrix knee flex - double leg 25# 2x10  Shuttle hip extension 1 red (12.5#) 2x10 ea  shuttle leg press +3 cords 2x10   Single leg shuttle press +3 cords 2x10 ea  sled push 25#- 2 laps on turf   farmer's carry 10# 2 laps on turf  palloff press GTB 20x ea     Home Exercises Provided and Patient Education Provided     Education provided: "   -    Written Home Exercises Provided: yes.  Exercises were reviewed and Vicente was able to demonstrate them prior to the end of the session.  Vicente demonstrated good  understanding of the education provided.     See EMR under Patient Instructions for exercises provided 6/21/2022.    Assessment     Pt was able to perform all therex without increase in pain or discomfort. He continues to be challenged with all functional strengthening such as farmer's carry and sled pushes. Muscle fasciculations and fatigue noted with bilateral leg extension. He tolerated increase in resistance with double and single leg shuttle.     Vicente Is progressing well towards his goals.   Pt prognosis is Good.     Pt will continue to benefit from skilled outpatient physical therapy to address the deficits listed in the problem list box on initial evaluation, provide pt/family education and to maximize pt's level of independence in the home and community environment.     Pt's spiritual, cultural and educational needs considered and pt agreeable to plan of care and goals.     Anticipated barriers to physical therapy: COVID-19 Concerns    Goals:   Short Term GOALS: 6 weeks. Pt agrees with goals set.  Updated 6/21/22, Partially MET  1. Patient demonstrates independence with HEP. met  2. Patient demonstrates independence with Postural Awareness. met  3. Patient demonstrates independence with body mechanics. met  4. Patient will report pain of 4/10 at worst, on 0-10 pain scale, with all activity met  5. Patient to complete 8 reps or greater on 30 Second Sit to Stand Test, from standard chair height met  6. Patient demonstrates increased strength BLE's to 4/5 or greater to improve tolerance to functional activities pain free.  In progress    Long Term GOALS: 12 weeks. Pt agrees with goals set. In progress  1. Patient demonstrates ability to walk 2 blocks, indep without AD, no major LOB in progress  2. Patient demonstrates increased strength  BLE's to 4+/5 or greater to improve tolerance to functional activities pain free. In progress  3. Patient demonstrates improved overall function per FOTO Neck Survey to 40% Limitation or less. met  4. Patient will report pain of 2/10 at worst, on 0-10 pain scale, with all activity, met  5. Patient demonstrates ability to complete Timed Up and Go (TUG) in 12 seconds or less, no AD, no major LOB, in progress    Plan     Plan of care Certification: 5/19/2022 to 8/16/22.    Progress strength, endurance, and balance activities as tolerated.  Will continue 3 more visits progressing towards d/c planning.     Rupinder Trevino, PT,DPT  07/05/2022

## 2022-07-12 ENCOUNTER — HOSPITAL ENCOUNTER (OUTPATIENT)
Dept: RADIOLOGY | Facility: HOSPITAL | Age: 57
Discharge: HOME | End: 2022-07-12
Attending: NEUROLOGICAL SURGERY
Payer: COMMERCIAL

## 2022-07-12 ENCOUNTER — OFFICE VISIT (OUTPATIENT)
Dept: NEUROSURGERY | Facility: CLINIC | Age: 57
End: 2022-07-12
Payer: COMMERCIAL

## 2022-07-12 VITALS
WEIGHT: 190.81 LBS | HEART RATE: 74 BPM | DIASTOLIC BLOOD PRESSURE: 93 MMHG | HEIGHT: 64 IN | SYSTOLIC BLOOD PRESSURE: 156 MMHG | TEMPERATURE: 99 F | BODY MASS INDEX: 32.58 KG/M2

## 2022-07-12 DIAGNOSIS — Z98.1 S/P CERVICAL SPINAL FUSION: Primary | ICD-10-CM

## 2022-07-12 DIAGNOSIS — Z98.1 S/P CERVICAL SPINAL FUSION: ICD-10-CM

## 2022-07-12 PROCEDURE — 3080F PR MOST RECENT DIASTOLIC BLOOD PRESSURE >= 90 MM HG: ICD-10-PCS | Mod: CPTII,S$GLB,, | Performed by: NEUROLOGICAL SURGERY

## 2022-07-12 PROCEDURE — 3077F PR MOST RECENT SYSTOLIC BLOOD PRESSURE >= 140 MM HG: ICD-10-PCS | Mod: CPTII,S$GLB,, | Performed by: NEUROLOGICAL SURGERY

## 2022-07-12 PROCEDURE — 99214 PR OFFICE/OUTPT VISIT, EST, LEVL IV, 30-39 MIN: ICD-10-PCS | Mod: S$GLB,,, | Performed by: NEUROLOGICAL SURGERY

## 2022-07-12 PROCEDURE — 99999 PR PBB SHADOW E&M-EST. PATIENT-LVL III: CPT | Mod: PBBFAC,,, | Performed by: NEUROLOGICAL SURGERY

## 2022-07-12 PROCEDURE — 3008F PR BODY MASS INDEX (BMI) DOCUMENTED: ICD-10-PCS | Mod: CPTII,S$GLB,, | Performed by: NEUROLOGICAL SURGERY

## 2022-07-12 PROCEDURE — 72040 X-RAY EXAM NECK SPINE 2-3 VW: CPT | Mod: TC

## 2022-07-12 PROCEDURE — 72040 X-RAY EXAM NECK SPINE 2-3 VW: CPT | Mod: 26,,, | Performed by: RADIOLOGY

## 2022-07-12 PROCEDURE — 3080F DIAST BP >= 90 MM HG: CPT | Mod: CPTII,S$GLB,, | Performed by: NEUROLOGICAL SURGERY

## 2022-07-12 PROCEDURE — 72040 XR CERVICAL SPINE AP LATERAL: ICD-10-PCS | Mod: 26,,, | Performed by: RADIOLOGY

## 2022-07-12 PROCEDURE — 3008F BODY MASS INDEX DOCD: CPT | Mod: CPTII,S$GLB,, | Performed by: NEUROLOGICAL SURGERY

## 2022-07-12 PROCEDURE — 99214 OFFICE O/P EST MOD 30 MIN: CPT | Mod: S$GLB,,, | Performed by: NEUROLOGICAL SURGERY

## 2022-07-12 PROCEDURE — 3077F SYST BP >= 140 MM HG: CPT | Mod: CPTII,S$GLB,, | Performed by: NEUROLOGICAL SURGERY

## 2022-07-12 PROCEDURE — 99999 PR PBB SHADOW E&M-EST. PATIENT-LVL III: ICD-10-PCS | Mod: PBBFAC,,, | Performed by: NEUROLOGICAL SURGERY

## 2022-07-12 NOTE — PROGRESS NOTES
"  Physical Therapy Discharge Note     Name: Vicente Greenwood  Clinic Number: [id number]    Therapy Diagnosis:   Encounter Diagnoses   Name Primary?    Impaired functional mobility and endurance Yes    Decreased strength of lower extremity      Physician: Michelle Johnson MD  Visit Date: 7/13/2022    Physician Orders: PT Eval and Treat   Medical Diagnosis from Referral: Cervical spondylosis with myelopathy and radiculopathy  Evaluation Date: 5/19/2022  Authorization Period Expiration: 7/1/22  Plan of Care Expiration: 8/16/22  Visit # / Visits authorized: 11/ 20 (+ eval)    PN Due: 7/21/22    Time In: 0835  Time Out: 0900    Total Billable Time: 55 minutes     Precautions:  s/p C3-4 ACDF on 3/20/22     Subjective     Pt reports:He has been doing well. He returned to work and has a lot of the gym equipment that is similar to at therapy and anticipates keeping up with his exercises once he is done with therapy.   He was compliant with home exercise program.  Response to previous treatment: good, feels stronger  Functional change: returning to work without pain    Pain: 0/10  Location: R posterior LE    Objective     TUG= 11 seconds    Strength: Knee    Left Right   Quadriceps 4+/5 4+/5   Hamstrings 4+/5 4+/5      Strength: Hip    Left Right   Iliopsoas 4/5 4-/5   Glute Med 4-/5 4-/5   Hip Ext 4/5 4-/5   Ankle PF 4+/5 4+/5   Ankle DF 4+/5 4+/5         Vicente received therapeutic exercises to develop strength, endurance, ROM, flexibility, posture and core stabilization for 55 minutes including:    MMT and TUG performed    Nustep 8 mins level 5     standing marches +GTB (alternating) 2x15  Standing hip abd +GTB 3x10   Standing hip extension +GTB 3x10  Standing SLR +GTB 3x10   Sit to Stand 3x10 7.5#  Step ups 6" bilateral 3x10  Side step RTB around knee pole to pole +3 laps     Matrix knee ext machine - double leg 35# - 3x10  Matrix knee flex - double leg 25# 2x10  +matrix hip abduction 40# 3x10   +matrix hip adduction " 35# 3x10  +matrix leg press 75# 3x10  +matrix single leg press 25# 3x10    Shuttle hip extension 1 red (12.5#) 2x10 ea  shuttle leg press +3 cords 2x10   Single leg shuttle press +3 cords 2x10 ea  sled push 25#- 2 laps on turf   farmer's carry 10# 2 laps on turf  palloff press GTB 20x ea     Home Exercises Provided and Patient Education Provided     Education provided:   -    Written Home Exercises Provided: yes.  Exercises were reviewed and Vicente was able to demonstrate them prior to the end of the session.  Vicente demonstrated good  understanding of the education provided.     See EMR under Patient Instructions for exercises provided 6/21/2022. 7/13/2022    Assessment     Pt continues with good tolerance to exercise program. Issued/reviewed updated HEP to include machine exercises as patient has access to gym at work and would like to continue strengthening independently. Pt has met most goals at this time with exception to BLE strength being 4+/5 however pt was educated on importance of continued strengthening in order to maintain strength gains made in therapy- pt verbalized understanding and agreement. Face to Face between PT/PTA occurred to discuss POC and patient is deemed appropriate for DC at this time.     Vicente Is progressing well towards his goals.   Pt prognosis is Good.     Pt's spiritual, cultural and educational needs considered and pt agreeable to plan of care and goals.     Anticipated barriers to physical therapy: COVID-19 Concerns    Goals:   Short Term GOALS: 6 weeks. Pt agrees with goals set.  Updated 7/13/22, Partially MET  1. Patient demonstrates independence with HEP. met  2. Patient demonstrates independence with Postural Awareness. met  3. Patient demonstrates independence with body mechanics. met  4. Patient will report pain of 4/10 at worst, on 0-10 pain scale, with all activity met  5. Patient to complete 8 reps or greater on 30 Second Sit to Stand Test, from standard chair height  met  6. Patient demonstrates increased strength BLE's to 4/5 or greater to improve tolerance to functional activities pain free.  Not met    Long Term GOALS: 12 weeks. Pt agrees with goals set.   1. Patient demonstrates ability to walk 2 blocks, indep without AD, no major LOB met   2. Patient demonstrates increased strength BLE's to 4+/5 or greater to improve tolerance to functional activities pain free.not met  3. Patient demonstrates improved overall function per FOTO Neck Survey to 40% Limitation or less. met  4. Patient will report pain of 2/10 at worst, on 0-10 pain scale, with all activity, met  5. Patient demonstrates ability to complete Timed Up and Go (TUG) in 12 seconds or less, no AD, no major LOB, met    Plan     Plan of care Certification: 5/19/2022 to 8/16/22.    DC with independent HEP    Rupinder Do,PT,DPT  Lucas Canseco, PTA  07/13/2022
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
## 2022-07-13 ENCOUNTER — CLINICAL SUPPORT (OUTPATIENT)
Dept: REHABILITATION | Facility: HOSPITAL | Age: 57
End: 2022-07-13
Payer: COMMERCIAL

## 2022-07-13 DIAGNOSIS — R29.898 DECREASED STRENGTH OF LOWER EXTREMITY: ICD-10-CM

## 2022-07-13 DIAGNOSIS — Z74.09 IMPAIRED FUNCTIONAL MOBILITY AND ENDURANCE: Primary | ICD-10-CM

## 2022-07-13 PROCEDURE — 97110 THERAPEUTIC EXERCISES: CPT | Mod: PN,CQ

## 2022-08-03 ENCOUNTER — HOSPITAL ENCOUNTER (OUTPATIENT)
Dept: RADIOLOGY | Facility: HOSPITAL | Age: 57
Discharge: HOME | End: 2022-08-03
Attending: PHYSICIAN ASSISTANT
Payer: COMMERCIAL

## 2022-08-03 DIAGNOSIS — Z98.1 ARTHRODESIS STATUS: ICD-10-CM

## 2022-08-03 PROCEDURE — 72125 CT NECK SPINE W/O DYE: CPT | Mod: TC

## 2022-08-03 PROCEDURE — 72125 CT CERVICAL SPINE WITHOUT CONTRAST: ICD-10-PCS | Mod: 26,,, | Performed by: INTERNAL MEDICINE

## 2022-08-03 PROCEDURE — 72125 CT NECK SPINE W/O DYE: CPT | Mod: 26,,, | Performed by: INTERNAL MEDICINE

## 2022-10-05 ENCOUNTER — CLINICAL SUPPORT (OUTPATIENT)
Dept: OTHER | Facility: CLINIC | Age: 57
End: 2022-10-05
Payer: COMMERCIAL

## 2022-10-05 DIAGNOSIS — Z00.8 ENCOUNTER FOR OTHER GENERAL EXAMINATION: ICD-10-CM

## 2022-10-05 PROCEDURE — 99401 PREV MED CNSL INDIV APPRX 15: CPT | Mod: S$GLB,,, | Performed by: INTERNAL MEDICINE

## 2022-10-05 PROCEDURE — 99401 PR PREVENT COUNSEL,INDIV,15 MIN: ICD-10-PCS | Mod: S$GLB,,, | Performed by: INTERNAL MEDICINE

## 2022-10-05 PROCEDURE — 80061 PR  LIPID PANEL: ICD-10-PCS | Mod: QW,S$GLB,, | Performed by: INTERNAL MEDICINE

## 2022-10-05 PROCEDURE — 82947 ASSAY GLUCOSE BLOOD QUANT: CPT | Mod: QW,S$GLB,, | Performed by: INTERNAL MEDICINE

## 2022-10-05 PROCEDURE — 80061 LIPID PANEL: CPT | Mod: QW,S$GLB,, | Performed by: INTERNAL MEDICINE

## 2022-10-05 PROCEDURE — 82947 PR  ASSAY QUANTITATIVE,BLOOD GLUCOSE: ICD-10-PCS | Mod: QW,S$GLB,, | Performed by: INTERNAL MEDICINE

## 2022-10-24 ENCOUNTER — TELEPHONE (OUTPATIENT)
Dept: NEUROSURGERY | Facility: CLINIC | Age: 57
End: 2022-10-24
Payer: COMMERCIAL

## 2022-10-24 LAB
HDLC SERPL-MCNC: 68 MG/DL
POC CHOLESTEROL, TOTAL: 194 MG/DL
POC GLUCOSE, FASTING: 97 MG/DL (ref 60–110)
TRIGL SERPL-MCNC: 44 MG/DL

## 2022-10-27 ENCOUNTER — TELEPHONE (OUTPATIENT)
Dept: NEUROSURGERY | Facility: CLINIC | Age: 57
End: 2022-10-27
Payer: COMMERCIAL

## 2022-10-27 DIAGNOSIS — Z98.1 S/P CERVICAL SPINAL FUSION: Primary | ICD-10-CM

## 2022-10-27 DIAGNOSIS — Z98.1 ARTHRODESIS STATUS: ICD-10-CM

## 2022-10-29 VITALS
BODY MASS INDEX: 30.22 KG/M2 | WEIGHT: 188 LBS | DIASTOLIC BLOOD PRESSURE: 88 MMHG | SYSTOLIC BLOOD PRESSURE: 128 MMHG | HEIGHT: 66 IN

## 2022-11-08 ENCOUNTER — TELEPHONE (OUTPATIENT)
Dept: FAMILY MEDICINE | Facility: CLINIC | Age: 57
End: 2022-11-08
Payer: COMMERCIAL

## 2024-03-23 ENCOUNTER — HOSPITAL ENCOUNTER (EMERGENCY)
Facility: HOSPITAL | Age: 59
Discharge: HOME | End: 2024-03-24
Attending: EMERGENCY MEDICINE
Payer: COMMERCIAL

## 2024-03-23 DIAGNOSIS — S00.531A CONTUSION OF LIP, INITIAL ENCOUNTER: ICD-10-CM

## 2024-03-23 DIAGNOSIS — S60.211A CONTUSION OF RIGHT WRIST, INITIAL ENCOUNTER: ICD-10-CM

## 2024-03-23 DIAGNOSIS — M79.631 RIGHT FOREARM PAIN: ICD-10-CM

## 2024-03-23 DIAGNOSIS — S60.212A CONTUSION OF LEFT WRIST, INITIAL ENCOUNTER: ICD-10-CM
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
## 2024-03-23 DIAGNOSIS — M25.531 RIGHT WRIST PAIN: ICD-10-CM

## 2024-03-23 DIAGNOSIS — S42.301A CLOSED FRACTURE OF RIGHT UPPER EXTREMITY, INITIAL ENCOUNTER: ICD-10-CM

## 2024-03-23 DIAGNOSIS — T07.XXXA ABRASIONS OF MULTIPLE SITES: ICD-10-CM

## 2024-03-23 DIAGNOSIS — M25.532 LEFT WRIST PAIN: ICD-10-CM

## 2024-03-23 DIAGNOSIS — S52.301A CLOSED FRACTURE OF SHAFT OF RIGHT RADIUS, UNSPECIFIED FRACTURE MORPHOLOGY, INITIAL ENCOUNTER: Primary | ICD-10-CM

## 2024-03-23 PROCEDURE — 99284 EMERGENCY DEPT VISIT MOD MDM: CPT | Mod: 25

## 2024-03-23 PROCEDURE — 29105 APPLICATION LONG ARM SPLINT: CPT | Mod: RT

## 2024-03-23 RX ORDER — IBUPROFEN 400 MG/1
800 TABLET ORAL
Status: COMPLETED | OUTPATIENT
Start: 2024-03-24 | End: 2024-03-23

## 2024-03-23 RX ORDER — HYDROCODONE BITARTRATE AND ACETAMINOPHEN 5; 325 MG/1; MG/1
1 TABLET ORAL
Status: COMPLETED | OUTPATIENT
Start: 2024-03-24 | End: 2024-03-23

## 2024-03-23 RX ADMIN — HYDROCODONE BITARTRATE AND ACETAMINOPHEN 1 TABLET: 5; 325 TABLET ORAL at 11:03

## 2024-03-23 RX ADMIN — IBUPROFEN 800 MG: 400 TABLET ORAL at 11:03

## 2024-03-23 NOTE — Clinical Note
"Vicente "Elaine Greenwood was seen and treated in our emergency department on 3/23/2024.  He may return to work on 04/08/2024.  Mr. Greenwood has a fracture of his right forearm.  He needs to be seen and cleared by orthopedics before returning to work.  He must be allowed to keep any support devices such as splint or cast in place while working.  Patient should be excused through 4/8/24, but may return sooner if cleared by ortho.     If you have any questions or concerns, please don't hesitate to call.      Wendy Haro MD"

## 2024-03-24 VITALS
HEART RATE: 59 BPM | TEMPERATURE: 98 F | SYSTOLIC BLOOD PRESSURE: 154 MMHG | HEIGHT: 63 IN | DIASTOLIC BLOOD PRESSURE: 77 MMHG | OXYGEN SATURATION: 99 % | BODY MASS INDEX: 33.66 KG/M2 | WEIGHT: 190 LBS | RESPIRATION RATE: 16 BRPM

## 2024-03-24 PROCEDURE — 29105 APPLICATION LONG ARM SPLINT: CPT | Mod: RT

## 2024-03-24 PROCEDURE — 25000003 PHARM REV CODE 250: Performed by: EMERGENCY MEDICINE

## 2024-03-24 RX ORDER — HYDROCODONE BITARTRATE AND ACETAMINOPHEN 10; 325 MG/1; MG/1
1 TABLET ORAL EVERY 4 HOURS PRN
Qty: 18 TABLET | Refills: 0 | Status: SHIPPED | OUTPATIENT
Start: 2024-03-24

## 2024-03-24 RX ORDER — AMOXICILLIN 250 MG
CAPSULE ORAL
Qty: 30 TABLET | Refills: 1 | Status: SHIPPED | OUTPATIENT
Start: 2024-03-24

## 2024-03-24 RX ORDER — IBUPROFEN 800 MG/1
800 TABLET ORAL EVERY 8 HOURS PRN
Qty: 30 TABLET | Refills: 1 | Status: SHIPPED | OUTPATIENT
Start: 2024-03-24

## 2024-03-24 RX ORDER — HYDROCODONE BITARTRATE AND ACETAMINOPHEN 10; 325 MG/1; MG/1
1 TABLET ORAL
Status: COMPLETED | OUTPATIENT
Start: 2024-03-24 | End: 2024-03-24

## 2024-03-24 RX ADMIN — HYDROCODONE BITARTRATE AND ACETAMINOPHEN 1 TABLET: 10; 325 TABLET ORAL at 03:03

## 2024-03-24 RX ADMIN — BACITRACIN ZINC, NEOMYCIN, POLYMYXIN B 1 EACH: 400; 3.5; 5 OINTMENT TOPICAL at 03:03
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
## 2024-03-25 ENCOUNTER — TELEPHONE (OUTPATIENT)
Dept: ORTHOPEDICS | Facility: CLINIC | Age: 59
End: 2024-03-25
Payer: COMMERCIAL

## 2024-03-25 NOTE — TELEPHONE ENCOUNTER
Spoken with patient and he said that this is not a litigation but he have not seek a  as of yet. Did tell the patient that  do not handles litigation case. Patient also said that he needed help in making his deductible with the appt. He did say he will call back

## 2024-03-25 NOTE — TELEPHONE ENCOUNTER
----- Message from Sarabjit Stiles MA sent at 3/25/2024  9:34 AM CDT -----  Type: Patient Call Back    Who called: Self    What is the request in detail: pt. Is asking for a sooner appt. He has a cast on his arm and is in pain he states ..     Can the clinic reply by [facility]?NO    Would the patient rather a call back or a response via [facility]? Yes, call     Best call back number: [phone] (home)

## 2025-02-13 ENCOUNTER — PATIENT OUTREACH (OUTPATIENT)
Dept: ADMINISTRATIVE | Facility: HOSPITAL | Age: 60
End: 2025-02-13
Payer: COMMERCIAL

## (undated) DEVICE — GAUZE SPONGE PEANUT STRL

## (undated) DEVICE — DRESSING TRANS 4X4 TEGADERM

## (undated) DEVICE — DIFFUSER

## (undated) DEVICE — GAUZE SPONGE 4X4 12PLY

## (undated) DEVICE — CORD BIPOLAR 12 FOOT

## (undated) DEVICE — DRAPE C ARM 42 X 120 10/BX

## (undated) DEVICE — TAPE SILK 3IN

## (undated) DEVICE — CARTRIDGE OIL

## (undated) DEVICE — KIT SURGIFLO HEMOSTATIC MATRIX

## (undated) DEVICE — DRAPE STERI-DRAPE 1000 17X11IN

## (undated) DEVICE — DRAPE THYROID WITH ARMBOARD

## (undated) DEVICE — SPONGE GAUZE 16PLY 4X4

## (undated) DEVICE — BUR BONE CUT MICRO TPS 3X3.8MM

## (undated) DEVICE — MARKER SKIN STND TIP BLUE BARR

## (undated) DEVICE — DRAPE OPMI STERILE

## (undated) DEVICE — DURAPREP SURG SCRUB 26ML

## (undated) DEVICE — PACK SET UP CONVERTORS

## (undated) DEVICE — SUT CTD VICRYL 3-0 CR/SH

## (undated) DEVICE — NDL SPINAL 18GX3.5 SPINOCAN

## (undated) DEVICE — TUBE FRAZIER 5MM 2FT SOFT TIP

## (undated) DEVICE — SUT MONOCRYL 4-0 PS-1 UND

## (undated) DEVICE — RESTRAINT LIMB HOLDER ADJ FOAM

## (undated) DEVICE — ELECTRODE REM PLYHSV RETURN 9

## (undated) DEVICE — SEE MEDLINE ITEM 157150

## (undated) DEVICE — SEE MEDLINE ITEM 156905

## (undated) DEVICE — PIN DISTRACTION DISP 14MM
Type: IMPLANTABLE DEVICE | Site: SPINE CERVICAL | Status: NON-FUNCTIONAL
Removed: 2022-03-20